# Patient Record
Sex: MALE | Race: WHITE | NOT HISPANIC OR LATINO | Employment: FULL TIME | URBAN - METROPOLITAN AREA
[De-identification: names, ages, dates, MRNs, and addresses within clinical notes are randomized per-mention and may not be internally consistent; named-entity substitution may affect disease eponyms.]

---

## 2018-12-10 ENCOUNTER — OFFICE VISIT (OUTPATIENT)
Dept: MEDICAL GROUP | Facility: PHYSICIAN GROUP | Age: 48
End: 2018-12-10
Payer: COMMERCIAL

## 2018-12-10 VITALS
BODY MASS INDEX: 25.2 KG/M2 | DIASTOLIC BLOOD PRESSURE: 118 MMHG | OXYGEN SATURATION: 96 % | SYSTOLIC BLOOD PRESSURE: 138 MMHG | TEMPERATURE: 98.6 F | WEIGHT: 176 LBS | HEIGHT: 70 IN | HEART RATE: 81 BPM

## 2018-12-10 DIAGNOSIS — M54.9 BACK PAIN, UNSPECIFIED BACK LOCATION, UNSPECIFIED BACK PAIN LATERALITY, UNSPECIFIED CHRONICITY: ICD-10-CM

## 2018-12-10 DIAGNOSIS — I10 ESSENTIAL HYPERTENSION: ICD-10-CM

## 2018-12-10 DIAGNOSIS — Z83.1 FAMILY HISTORY OF HEPATITIS B: ICD-10-CM

## 2018-12-10 PROCEDURE — 99214 OFFICE O/P EST MOD 30 MIN: CPT | Performed by: NURSE PRACTITIONER

## 2018-12-10 RX ORDER — GABAPENTIN 300 MG/1
300 CAPSULE ORAL DAILY
COMMUNITY
Start: 2018-12-04 | End: 2019-01-16

## 2018-12-10 RX ORDER — LISINOPRIL 20 MG/1
20 TABLET ORAL DAILY
Qty: 90 TAB | Refills: 0 | Status: SHIPPED | OUTPATIENT
Start: 2018-12-10 | End: 2019-01-16 | Stop reason: SINTOL

## 2018-12-10 ASSESSMENT — PATIENT HEALTH QUESTIONNAIRE - PHQ9: CLINICAL INTERPRETATION OF PHQ2 SCORE: 0

## 2018-12-10 NOTE — ASSESSMENT & PLAN NOTE
This is a chronic problem for the patient that is uncontrolled.  The patient's current blood pressure is 138/118 with a heart rate of 81.  The patient was prescribed lisinopril 10 mg to be taken 1 time per day in December 2016.  Patient reports that he had taken this medication for 3 months and was supposed to follow-up with his PCP, but he did not follow-up and did not have refills, so he has been not taking lisinopril since the beginning of January 2017.  The patient reports that whenever he has his blood pressure checked his diastolic is always around 120.  The patient does not check his blood pressure on his own, but states he will buy a home blood pressure monitor cuff.  He was with some family who checked his blood pressure and he states that his systolic will be 170-220 and diastolic usually around 120. The patient denies chest pain, shortness of breath, headaches, dizziness, blurry vision, or dyspnea on exertion.

## 2018-12-10 NOTE — ASSESSMENT & PLAN NOTE
This is a chronic problem for the patient that is managed by pain specialty, [facility] Val[facility]UNC[facility].  The patient follows up 1 time per month for this issue.

## 2018-12-10 NOTE — PROGRESS NOTES
CC:   Chief Complaint   Patient presents with   • Hypertension   • Orders Needed     labs for hep B        HISTORY OF THE PRESENT ILLNESS: Patient is a 48 y.o. male. This pleasant patient is here today to discuss high blood pressure and requests labs to check for hepatitis B.    Health Maintenance: Updated, patient refuses flu shot.    Back pain  This is a chronic problem for the patient that is managed by pain specialty, [facility] DUNC[facility].  The patient follows up 1 time per month for this issue.    Essential hypertension  This is a chronic problem for the patient that is uncontrolled.  The patient's current blood pressure is 138/118 with a heart rate of 81.  The patient was prescribed lisinopril 10 mg to be taken 1 time per day in December 2016.  Patient reports that he had taken this medication for 3 months and was supposed to follow-up with his PCP, but he did not follow-up and did not have refills, so he has been not taking lisinopril since the beginning of January 2017.  The patient reports that whenever he has his blood pressure checked his diastolic is always around 120.  The patient does not check his blood pressure on his own, but states he will buy a home blood pressure monitor cuff.  He was with some family who checked his blood pressure and he states that his systolic will be 170-220 and diastolic usually around 120. The patient denies chest pain, shortness of breath, headaches, dizziness, blurry vision, or dyspnea on exertion.    Family history of hepatitis B  This is a new problem for the patient.  The patient's brother recently was tested positive for hepatitis B, and he recently found out that his mom is hepatitis B carrier.  It was recommended that all of his siblings be tested for hepatitis, patient is requesting lab work.    Allergies: Patient has no known allergies.    Current Outpatient Prescriptions Ordered in [facility]   Medication Sig Dispense Refill   •  lisinopril (PRINIVIL) 20 MG Tab Take 1 Tab by mouth every day. 90 Tab 0   • tizanidine (ZANAFLEX) 4 MG TABS Take 4 mg by mouth every 6 hours as needed.     • hydrocodone/apap  (NORCO)  MG TABS Take 1-2 Tabs by mouth every 8 hours as needed for Mild Pain. 60 Each 1   • gabapentin (NEURONTIN) 300 MG Cap Take 300 mg by mouth every day.     • sildenafil citrate (VIAGRA) 25 MG Tab Take 1 Tab by mouth as needed for Erectile Dysfunction. 10 Tab 3   • rizatriptan (MAXALT) 10 MG tablet Take 10 mg by mouth Once PRN for Migraine.       No current [facility]-ordered facility-administered medications on file.        Past Medical History:   Diagnosis Date   • Headache(784.0)    • Hypertension    • Neck pain        Past Surgical History:   Procedure Laterality Date   • VA DSTR NROLYTC AGNT PARVERTEB FCT SNGL CRVCL/THORA Right 10/21/2016    Procedure: NEURO DEST FACET C/T W/IG SNGL;  Surgeon: Kelton Gould D.O.;  [facility];  Service: Pain Management   • VA DSTR NROLYTC AGNT PARVERTEB FCT ADDL CRVCL/THORA  10/21/2016    Procedure: NEURO DEST FACET C/T W/IG ADDL;  Surgeon: Kelton Gould D.O.;  [facility];  Service: Pain Management   • VA DSTR NROLYTC AGNT PARVERTEB FCT SNGL CRVCL/THORA Left 7/8/2016    Procedure: NEURO DEST FACET C/T W/IG SNGL - C5-7;  Surgeon: Kelton Gould D.O.;  [facility];  Service: Pain Management   • VA DSTR NROLYTC AGNT PARVERTEB FCT ADDL CRVCL/THORA Left 7/8/2016    Procedure: NEURO DEST FACET C/T W/IG ADDL;  Surgeon: Kelton Gould D.O.;  [facility];  Service: Pain Management   • VA DSTR NROLYTC AGNT PARVERTEB FCT SNGL CRVCL/THORA Right 2/12/2016    Procedure: NEURO DEST FACET C/T W/IG SNGL - C5-7;  Surgeon: Kelton Gould D.O.;  [facility];  Service: Pain Management   • VA DSTR NROLYTC AGNT MARCOSERTEB FCT ADDL CRVCL/THORA  2/12/2016    Procedure: NEURO  DEST FACET C/T W/IG ADDL;  Surgeon: Kelton Gould D.O.;  Location: [facility];  Service: Pain Management   • PB INJ CERV/THORAC,W/WO CNTRST Right 11/20/2015    Procedure: INJ EPI NON NEUROLYTIC C/T - C7-T1 W/SPRING TIP CATHETER TO RIGHT C5-6;  Surgeon: Kelton Gould D.O.;  Location: [facility];  Service: Pain Management   • PB FLUORSCOPIC GUIDANCE SPINAL INJECTION  11/20/2015    Procedure: FLUOROGUIDE FOR SPINAL INJ;  Surgeon: Kelton Gould D.O.;  Location: [facility];  Service: Pain Management   • NEURO DEST FACET C/T W/IG SNGL Right 5/8/2015    Procedure: RIGHT C4-7 FACET JOINT NERVE ABLATIONFLUORO, PRONE, RF PROTOCOL;  Surgeon: Kelton Gould D.O.;  Location: [facility];  Service: Pain Management   • NEURO DEST FACET C/T W/IG ADDL  5/8/2015    Procedure: NEURO DEST FACET C/T W/IG ADDL;  Surgeon: Kelton Gould D.O.;  Location: [facility];  Service: Pain Management   • NEURO DEST FACET C/T W/IG ADDL  5/8/2015    Procedure: NEURO DEST FACET C/T W/IG ADDL;  Surgeon: Kelton Gould D.O.;  Location: [facility];  Service: Pain Management   • NEURO DEST FACET C/T W/IG SNGL  2/20/2015    Performed by Kelton Gould D.O. at [facility]   • NEURO DEST FACET C/T W/IG ADDL  2/20/2015    Performed by Kelton Gould D.O. at [facility]   • NEURO DEST FACET C/T W/IG ADDL  2/20/2015    Performed by Kelton Gould D.O. at [facility]   • NEURO DEST FACET L/S W/IG SNGL  3/21/2014    Performed by Kelton Gould D.O. at [facility]   • NEURO DEST FACET C/T W/IG ADDL  3/21/2014    Performed by Kelton Gould D.O. at [facility]   • NEURO DEST FACET C/T W/IG ADDL  3/21/2014    Performed by Kelton Gould D.O. at [facility]   • NEURO DEST FACET C/T W/IG ADDL  3/21/2014    Performed by Kelton Gould D.O. at  [facility]   • INJ,EPIDURAL,CERV/THOR SINGLE  9/13/2013    Performed by Destini Franklin D.O. at [facility]   • INJ,EPIDURAL,CERV/THOR SINGLE  9/13/2013    Performed by Destini Franklin D.O. at [facility]   • INJ,EPIDURAL,CERV/THOR SINGLE  9/13/2013    Performed by Destini Franklin D.O. at [facility]   • NEURO DEST FACET C/T W/IG SNGL  5/3/2013    Performed by Destini Franklin D.O. at [facility]   • NEURO DEST FACET C/T W/IG ADDL  5/3/2013    Performed by Destini Franklin D.O. at [facility]   • NEURO DEST FACET C/T W/IG SNGL  1/11/2013    Performed by Destini Franklin D.O. at [facility]   • NEURO DEST FACET C/T W/IG SNGL  6/1/2012    Performed by DESTINI FRANKLIN at [facility]   • PB INJECT NERV BLCK,GREAT OCCIPTL  5/11/2012    Performed by DESTINI FRANKLIN at [facility]   • [place] DEST,PARAVERTEBRAL,C/T,SINGLE  9/23/2011    Performed by DESTINI FRANKLIN at [facility]   • [place] DEST,PARAVERTEBRAL,C/T,ADDL  9/23/2011    Performed by DESTINI FRANKLIN at [facility]   • [place] DEST,PARAVERTEBRAL,C/T,ADDL  9/23/2011    Performed by DESTINI FRANKLIN at [facility]   • [place] DEST,PARAVERTEBRAL,C/T,ADDL  9/23/2011    Performed by DESTINI FRANKLIN at [facility]   • [place] DEST,PARAVERTEBRAL,C/T,ADDL  8/5/2011    Performed by DESTINI FRANKLIN at [facility]   • [place] DEST,PARAVERTEBRAL,C/T,SINGLE  8/5/2011    Performed by DESTINI FRANKLIN at [facility]   • PB INJ DX/THER AGNT PARAVERT FACET JOINT, CE*  7/15/2011    Performed by DESTINI FRANKLIN at [facility]   • PB INJ DX/THER AGNT PARAVERT FACET JOINT, CE*  7/1/2011    Performed by DESTINI FRANKLIN at [facility]   • LAMINOTOMY  2/18/2010    C4-C5 fusion   • ELBOW ORIF  year 2009    bilateral elbow surgery   • OTHER  ORTHOPEDIC SURGERY      bilat knee surgeries    • OTHER ORTHOPEDIC SURGERY      bilat elbow surgeries for tennis elbow   • OTHER ORTHOPEDIC SURGERY      cervical fusions       Social History   Substance Use Topics   • Smoking status: Never Smoker   • Smokeless tobacco: Never Used   • Alcohol use No       Social History     Social History Narrative   • No narrative on file       Family History   Problem Relation Age of Onset   • Other Mother         hep b carrier   • Hypertension Father    • No Known Problems Sister    • No Known Problems Brother    • No Known Problems Maternal Grandmother    • No Known Problems Maternal Grandfather    • No Known Problems Paternal Grandmother    • No Known Problems Paternal Grandfather    • No Known Problems Sister    • No Known Problems Brother    • No Known Problems Brother    • No Known Problems Brother    • No Known Problems Brother    • Other Brother         Hep B +       ROS:   Constitutional:  Negative for fever, chills, unexpected weight change, night sweats, body aches, sleep issues,  and fatigue/generalized weakness.   HEENT:  Negative for headaches, vision changes, hearing changes, ear pain, tinnitus, ear discharge, rhinorrhea, sinus congestion, sneezing, sore throat, and neck pain.    Respiratory:  Negative for cough, shortness of breath, sputum production, hemoptysis, chest congestion, dyspnea, wheezing, and crackles.    Cardiovascular:  Negative for chest pain, palpitations, DE LUNA, paroxsymal nocturnal dyspnea, orthopnea, and bilateral lower extremity edema.   Gastrointestinal:  Negative for heartburn, nausea, vomiting, abdominal pain, hematochezia, melena, diarrhea, constipation, and greasy/foul-smelling stools.   Genitourinary:  Negative for dysuria, nocturia, polyuria, hematuria, pyuria, urinary urgency, urinary frequency, and urinary incontinence.   Musculoskeletal: Positive for neck, back pain.    Skin:  Negative for rash, sores, lumps, itching, cyanotic skin color  "change.   Neurological:  Negative for dizziness, tingling, tremors, focal sensory deficit, focal weakness and headaches.   Endo/Heme/Allergies:  Does not bruise/bleed easily. Denies cold/heat intolerance.   Psychiatric/Behavioral: Negative for depression, suicidal/homicidal ideation and memory loss.        Exam: Blood pressure 138/118, pulse 81, temperature 37 °C (98.6 °F), temperature source Temporal, height 1.778 m (5' 10\"), weight 79.8 kg (176 lb), SpO2 96 %. Body mass index is 25.25 kg/m².    General:  Normal appearing. No distress.  HEENT:  Normocephalic. Eyes conjunctiva clear lids without ptosis, pupils equal and reactive to light accommodation, ears normal shape and contour.   Pulmonary:  Clear to ausculation.  Normal effort. No rales, ronchi, or wheezing.  Cardiovascular:  Regular rate and rhythm without murmur. Carotid and radial pulses are intact and equal bilaterally.  Neurologic:  Grossly nonfocal.  Skin:  Warm and dry.  No obvious lesions.  Musculoskeletal:  Normal gait. No extremity cyanosis, clubbing, or edema.  Psych:  Normal mood and affect. Alert and oriented x3. Judgment and insight is normal.    Assessment/Plan  1. Essential hypertension  This is a chronic problem for the patient that is uncontrolled.  The patient reports that his blood pressure will range from 170-220/120.  2 years ago he was prescribed lisinopril 10 mg to be taken 1 time per day, but patient states that he only took this medication for 3 months and ran out of refills and never followed up with PCP.  Plan to start lisinopril 20 mg tab to be taken 1 time per day.  The patient's last lab work was completed in 2010, plan to check a CMP, lipid profile, microalbumin creatinine ratio urine prior to follow-up in 1 month.  - COMP METABOLIC PANEL; Future  - Lipid Profile; Future  - MICROALBUMIN CREAT RATIO URINE; Future  - lisinopril (PRINIVIL) 20 MG Tab; Take 1 Tab by mouth every day.  Dispense: 90 Tab; Refill: 0    2. Family history " of hepatitis B  This is a new problem for the patient.  The patient recently found out that his brother was hepatitis B positive and his mother was hepatitis B carrier.  He is requesting a hepatitis panel, order provided.  - HEPATITIS PANEL ACUTE(4 COMPONENTS); Future    3. Back pain, unspecified back location, unspecified back pain laterality, unspecified chronicity  This is a chronic problem for the patient that is managed by [facility].    Educated in proper administration of medication(s) ordered today including safety, possible SE, risks, benefits, rationale and alternatives to therapy.   Supportive care, differential diagnoses, and indications for immediate follow-up discussed with patient.    Pathogenesis of diagnosis discussed including typical length and natural progression.    Instructed to return to clinic or nearest emergency department for any change in condition, further concerns, or worsening of symptoms.  Patient states understanding of the plan of care and discharge instructions.    Return in about 1 month (around 1/10/2019) for Hypertension, Follow up Labs, Establish.    Please note that this dictation was created using voice recognition software. I have made every reasonable attempt to correct obvious errors, but I expect that there are errors of grammar and possibly content that I did not discover before finalizing the note.

## 2018-12-10 NOTE — ASSESSMENT & PLAN NOTE
This is a new problem for the patient.  The patient's brother recently was tested positive for hepatitis B, and he recently found out that his mom is hepatitis B carrier.  It was recommended that all of his siblings be tested for hepatitis, patient is requesting lab work.

## 2019-01-16 ENCOUNTER — OFFICE VISIT (OUTPATIENT)
Dept: MEDICAL GROUP | Facility: PHYSICIAN GROUP | Age: 49
End: 2019-01-16
Payer: COMMERCIAL

## 2019-01-16 ENCOUNTER — HOSPITAL ENCOUNTER (OUTPATIENT)
Dept: LAB | Facility: MEDICAL CENTER | Age: 49
End: 2019-01-16
Attending: NURSE PRACTITIONER
Payer: COMMERCIAL

## 2019-01-16 VITALS
HEART RATE: 71 BPM | DIASTOLIC BLOOD PRESSURE: 98 MMHG | HEIGHT: 70 IN | SYSTOLIC BLOOD PRESSURE: 132 MMHG | OXYGEN SATURATION: 97 % | BODY MASS INDEX: 24.91 KG/M2 | TEMPERATURE: 98.2 F | WEIGHT: 174 LBS

## 2019-01-16 DIAGNOSIS — Z83.1 FAMILY HISTORY OF HEPATITIS B: ICD-10-CM

## 2019-01-16 DIAGNOSIS — I10 ESSENTIAL HYPERTENSION: ICD-10-CM

## 2019-01-16 LAB
ALBUMIN SERPL BCP-MCNC: 4.4 G/DL (ref 3.2–4.9)
ALBUMIN/GLOB SERPL: 1.4 G/DL
ALP SERPL-CCNC: 47 U/L (ref 30–99)
ALT SERPL-CCNC: 26 U/L (ref 2–50)
ANION GAP SERPL CALC-SCNC: 11 MMOL/L (ref 0–11.9)
AST SERPL-CCNC: 19 U/L (ref 12–45)
BILIRUB SERPL-MCNC: 0.7 MG/DL (ref 0.1–1.5)
BUN SERPL-MCNC: 17 MG/DL (ref 8–22)
CALCIUM SERPL-MCNC: 9.4 MG/DL (ref 8.5–10.5)
CHLORIDE SERPL-SCNC: 105 MMOL/L (ref 96–112)
CHOLEST SERPL-MCNC: 214 MG/DL (ref 100–199)
CO2 SERPL-SCNC: 23 MMOL/L (ref 20–33)
CREAT SERPL-MCNC: 0.91 MG/DL (ref 0.5–1.4)
CREAT UR-MCNC: 118.9 MG/DL
FASTING STATUS PATIENT QL REPORTED: NORMAL
GLOBULIN SER CALC-MCNC: 3.2 G/DL (ref 1.9–3.5)
GLUCOSE SERPL-MCNC: 98 MG/DL (ref 65–99)
HAV IGM SERPL QL IA: NEGATIVE
HBV CORE IGM SER QL: NEGATIVE
HBV SURFACE AG SER QL: NEGATIVE
HCV AB SER QL: NEGATIVE
HDLC SERPL-MCNC: 54 MG/DL
LDLC SERPL CALC-MCNC: 121 MG/DL
MICROALBUMIN UR-MCNC: <0.7 MG/DL
MICROALBUMIN/CREAT UR: NORMAL MG/G (ref 0–30)
POTASSIUM SERPL-SCNC: 4.3 MMOL/L (ref 3.6–5.5)
PROT SERPL-MCNC: 7.6 G/DL (ref 6–8.2)
SODIUM SERPL-SCNC: 139 MMOL/L (ref 135–145)
TRIGL SERPL-MCNC: 193 MG/DL (ref 0–149)

## 2019-01-16 PROCEDURE — 82043 UR ALBUMIN QUANTITATIVE: CPT

## 2019-01-16 PROCEDURE — 82570 ASSAY OF URINE CREATININE: CPT

## 2019-01-16 PROCEDURE — 36415 COLL VENOUS BLD VENIPUNCTURE: CPT

## 2019-01-16 PROCEDURE — 80061 LIPID PANEL: CPT

## 2019-01-16 PROCEDURE — 99213 OFFICE O/P EST LOW 20 MIN: CPT | Performed by: NURSE PRACTITIONER

## 2019-01-16 PROCEDURE — 80053 COMPREHEN METABOLIC PANEL: CPT

## 2019-01-16 PROCEDURE — 80074 ACUTE HEPATITIS PANEL: CPT

## 2019-01-16 RX ORDER — EPINEPHRINE 0.3 MG/.3ML
0.3 INJECTION SUBCUTANEOUS PRN
COMMUNITY
Start: 2019-01-06

## 2019-01-16 RX ORDER — AMLODIPINE BESYLATE 5 MG/1
5 TABLET ORAL DAILY
Qty: 30 TAB | Refills: 0 | Status: SHIPPED | OUTPATIENT
Start: 2019-01-16 | End: 2019-02-20 | Stop reason: SDUPTHER

## 2019-01-16 ASSESSMENT — PATIENT HEALTH QUESTIONNAIRE - PHQ9: CLINICAL INTERPRETATION OF PHQ2 SCORE: 0

## 2019-01-16 NOTE — PROGRESS NOTES
CC:   Chief Complaint   Patient presents with   • Hypertension     follow up        HISTORY OF THE PRESENT ILLNESS: Patient is a 48 y.o. male. This pleasant patient is here today follow-up on hypertension.    Health Maintenance: Completed, patient refuses flu vaccine.    Essential hypertension  This is a chronic problem for the patient that remains uncontrolled.  The patient's blood pressure on arrival today is 128/106 with a heart rate of 71, compared to 12/10/2018 138/118 heart rate 81.  Recheck at the end of the appointment: 132/98 with heart rate of 71.  One month ago the patient was started on lisinopril 20 mg/day.  Approximately 10 days ago the patient was having a feeling that his uvula and throat were swelling and he was having difficulty breathing so he went to the [facility] emergency room.  There he was given steroids and a prescription for an EpiPen as needed.  The patient has continued taking the lisinopril since this episode.  The patient does not check his blood pressure at home.  Our goal blood pressure for this patient is <140/90. The patient denies chest pain, shortness of breath, headaches, dizziness, blurry vision, or dyspnea on exertion.    Allergies: Lisinopril    Current Outpatient Prescriptions Ordered in [facility]   Medication Sig Dispense Refill   • amLODIPine (NORVASC) 5 MG Tab Take 1 Tab by mouth every day. 30 Tab 0   • rizatriptan (MAXALT) 10 MG tablet Take 10 mg by mouth Once PRN for Migraine.     • tizanidine (ZANAFLEX) 4 MG TABS Take 4 mg by mouth every 6 hours as needed.     • hydrocodone/apap  (NORCO)  MG TABS Take 1-2 Tabs by mouth every 8 hours as needed for Mild Pain. 60 Each 1   • EPINEPHrine (EPIPEN) 0.3 MG/0.3ML Solution Auto-injector solution for injection 0.3 mg by Intramuscular route as needed.     • sildenafil citrate (VIAGRA) 25 MG Tab Take 1 Tab by mouth as needed for Erectile Dysfunction. 10 Tab 3     No current Epic-ordered  facility-administered medications on file.        Past Medical History:   Diagnosis Date   • Headache(784.0)    • Hypertension    • Neck pain        Past Surgical History:   Procedure Laterality Date   • [place] DSTR NROLYTC AGNT PARVERTEB FCT SNGL CRVCL/THORA Right 10/21/2016    Procedure: NEURO DEST FACET C/T W/IG SNGL;  Surgeon: Kelton Gould D.O.;  Location[place] [facility];  Service: Pain Management   • KY DSTR NROLYTC AGNT PARVERTEB FCT ADDL CRVCL/THORA  10/21/2016    Procedure: NEURO DEST FACET C/T W/IG ADDL;  Surgeon: Kelton Gould D.O.;  Location[place] [facility] [facility];  Service: Pain Management   • KY DSTR NROLYTC AGNT PARVERTEB FCT SNGL CRVCL/THORA Left 7/8/2016    Procedure: NEURO DEST FACET C/T W/IG SNGL - C5-7;  Surgeon: Kelton Gould D.O.;  Location[place] [facility] [facility];  Service: Pain Management   • KY DSTR NROLYTC AGNT PARVERTEB FCT ADDL CRVCL/THORA Left 7/8/2016    Procedure: NEURO DEST FACET C/T W/IG ADDL;  Surgeon: Kelton Gould D.O.;  Location[place] [facility] [facility];  Service: Pain Management   • [place] DSTR NROLYTC AGNT PARVERTEB FCT SNGL CRVCL/THORA Right 2/12/2016    Procedure: NEURO DEST FACET C/T W/IG SNGL - C5-7;  Surgeon: Kelton Gould D.O.;  Location: [facility] [place] [facility];  Service: Pain Management   • [place] DSTR NROLYTC AGNT PARVERTEB FCT ADDL CRVCL/THORA  2/12/2016    Procedure: NEURO DEST FACET C/T W/IG ADDL;  Surgeon: Kelton Gould D.O.;  Location: [facility] [place] [facility];  Service: Pain Management   • PB INJ CERV/THORAC,W/WO CNTRST Right 11/20/2015    Procedure: INJ EPI NON NEUROLYTIC C/T - C7-T1 W/SPRING TIP CATHETER TO RIGHT C5-6;  Surgeon: Kelton Gould D.O.;  Location: [facility];  Service: Pain Management   • PB FLUORSCOPIC GUIDANCE SPINAL INJECTION  11/20/2015    Procedure: FLUOROGUIDE FOR SPINAL INJ;  Surgeon: Kelton Gould D.O.;  Location: SUR[facility]ERY [facility];  Service: Pain Management   • NEURO  DEST FACET C/T W/IG SNGL Right 5/8/2015    Procedure: RIGHT C4-7 FACET JOINT NERVE ABLATIONFLUORO, PRONE, RF PROTOCOL;  Surgeon: Kelton Gould D.O.;  Location: [facility];  Service: Pain Management   • NEURO DEST FACET C/T W/IG ADDL  5/8/2015    Procedure: NEURO DEST FACET C/T W/IG ADDL;  Surgeon: Kelton Gould D.O.;  Location: [facility];  Service: Pain Management   • NEURO DEST FACET C/T W/IG ADDL  5/8/2015    Procedure: NEURO DEST FACET C/T W/IG ADDL;  Surgeon: Kelton Gould D.O.;  Location: [facility];  Service: Pain Management   • NEURO DEST FACET C/T W/IG SNGL  2/20/2015    Performed by Kelton Gould D.O. at [facility]   • NEURO DEST FACET C/T W/IG ADDL  2/20/2015    Performed by Kelton Gould D.O. at [facility]   • NEURO DEST FACET C/T W/IG ADDL  2/20/2015    Performed by Kelton Gould D.O. at [facility]   • NEURO DEST FACET L/S W/IG SNGL  3/21/2014    Performed by Kelton Gould D.O. at [facility]   • NEURO DEST FACET C/T W/IG ADDL  3/21/2014    Performed by Kelton Gould D.O. at [facility]   • NEURO DEST FACET C/T W/IG ADDL  3/21/2014    Performed by Kelton Gould D.O. at [facility]   • NEURO DEST FACET C/T W/IG ADDL  3/21/2014    Performed by Kelton Gould D.O. at [facility]   • INJ,EPIDURAL,CERV/THOR SINGLE  9/13/2013    Performed by Kelton Gould D.O. at [facility]   • INJ,EPIDURAL,CERV/THOR SINGLE  9/13/2013    Performed by Kelton Gould D.O. at [facility]   • INJ,EPIDURAL,CERV/THOR SINGLE  9/13/2013    Performed by Kelton Gould D.O. at [facility]   • NEURO DEST FACET C/T W/IG SNGL  5/3/2013    Performed by Kelton Gould D.O. at [facility]   • NEURO DEST FACET C/T W/IG ADDL  5/3/2013    Performed by Kelton Gould D.O. at  [facility]   • NEURO DEST FACET C/T W/IG SNGL  1/11/2013    Performed by Destini Franklin D.O. at [facility]   • NEURO DEST FACET C/T W/IG SNGL  6/1/2012    Performed by DESTINI FRANKLIN at [facility]   • PB INJECT NERV BLCK,GREAT OCCIPTL  5/11/2012    Performed by DESTINI FRANKLIN at [facility]   • AK DEST,PARAVERTEBRAL,C/T,SINGLE  9/23/2011    Performed by DESTINI FRANKLIN at [facility]   • AK DEST,PARAVERTEBRAL,C/T,ADDL  9/23/2011    Performed by DESTINI FRANKLIN at [facility]   • AK DEST,PARAVERTEBRAL,C/T,ADDL  9/23/2011    Performed by DESTINI RFANKLIN at [facility]   • AK DEST,PARAVERTEBRAL,C/T,ADDL  9/23/2011    Performed by DESTINI FRANKLIN at [facility]   • AK DEST,PARAVERTEBRAL,C/T,ADDL  8/5/2011    Performed by DESTINI FRANKLIN at [facility]   • AK DEST,PARAVERTEBRAL,C/T,SINGLE  8/5/2011    Performed by DESTINI FRANKLIN at [facility]   • PB INJ DX/THER AGNT PARAVERT FACET JOINT, CE*  7/15/2011    Performed by DESTINI FRANKLIN at [facility]   • PB INJ DX/THER AGNT PARAVERT FACET JOINT, CE*  7/1/2011    Performed by DESTINI FRANKLIN at [facility]   • LAMINOTOMY  2/18/2010    C4-C5 fusion   • ELBOW ORIF  year 2009    bilateral elbow surgery   • OTHER ORTHOPEDIC SURGERY      bilat knee surgeries    • OTHER ORTHOPEDIC SURGERY      bilat elbow surgeries for tennis elbow   • OTHER ORTHOPEDIC SURGERY      cervical fusions       Social History   Substance Use Topics   • Smoking status: Never Smoker   • Smokeless tobacco: Never Used   • Alcohol use No       Social History     Social History Narrative   • No narrative on file       Family History   Problem Relation Age of Onset   • Other Mother         hep b carrier   • Hypertension Father    • No Known Problems Sister    • No Known Problems Brother    • No Known  "Problems Maternal Grandmother    • No Known Problems Maternal Grandfather    • No Known Problems Paternal Grandmother    • No Known Problems Paternal Grandfather    • No Known Problems Sister    • No Known Problems Brother    • No Known Problems Brother    • No Known Problems Brother    • No Known Problems Brother    • Other Brother         Hep B +     ROS:   Constitutional:  Negative for fever, chills, unexpected weight change, night sweats, body aches, sleep issues,  and fatigue/generalized weakness.   HEENT: Positive for throat swelling.  Negative for headaches, vision changes, hearing changes, ear pain, tinnitus, ear discharge, rhinorrhea, sinus congestion, sneezing, sore throat, and neck pain.    Respiratory:  Negative for cough, shortness of breath, sputum production, hemoptysis, chest congestion, dyspnea, wheezing, and crackles.    Cardiovascular:  Negative for chest pain, palpitations, DE LUNA, paroxsymal nocturnal dyspnea, orthopnea, and bilateral lower extremity edema.   Gastrointestinal:  Negative for heartburn, nausea, vomiting, abdominal pain, hematochezia, melena, diarrhea, constipation, and greasy/foul-smelling stools.   Genitourinary:  Negative for dysuria, nocturia, polyuria, hematuria, pyuria, urinary urgency, urinary frequency, and urinary incontinence.   Musculoskeletal:  Negative for myalgias, back pain, and joint pain.   Skin:  Negative for rash, sores, lumps, itching, cyanotic skin color change.   Neurological:  Negative for dizziness, tingling, tremors, focal sensory deficit, focal weakness and headaches.   Endo/Heme/Allergies:  Does not bruise/bleed easily. Denies cold/heat intolerance.   Psychiatric/Behavioral: Negative for depression, suicidal/homicidal ideation and memory loss.        Exam: Blood pressure 132/98, pulse 71, temperature 36.8 °C (98.2 °F), temperature source Temporal, height 1.778 m (5' 10\"), weight 78.9 kg (174 lb), SpO2 97 %. Body mass index is 24.97 kg/m².    General:  Normal " appearing. No distress.  HEENT:  Normocephalic. Eyes conjunctiva clear lids without ptosis, pupils equal and reactive to light accommodation, ears normal shape and contour, oropharynx is without erythema, edema or exudates.   Neck:  Supple without JVD or bruit.  Pulmonary:  Clear to ausculation.  Normal effort. No rales, ronchi, or wheezing.  Cardiovascular:  Regular rate and rhythm without murmur. Carotid and radial pulses are intact and equal bilaterally.  Abdomen:  Soft, nontender, nondistended. Normal bowel sounds. Liver and spleen are not palpable.  Neurologic:  Grossly nonfocal.  Lymph:  No cervical, supraclavicular or axillary lymph nodes are palpable.  Skin:  Warm and dry.  No obvious lesions.  Musculoskeletal:  Normal gait. No extremity cyanosis, clubbing, or edema.  Psych:  Normal mood and affect. Alert and oriented x3. Judgment and insight is normal.    Assessment/Plan  1. Essential hypertension  amLODIPine (NORVASC) 5 MG Tab  The patient will stop taking lisinopril, this medication has been added to his list of allergies.  Patient will start amlodipine 5 mg/day and follow-up in 1 month to review blood pressure, patient advised to notify PCP if any side effects from amlodipine occur.  The patient was prescribed an EpiPen when he was at the ER, advised patient to keep EpiPen with him just in case.  Previously ordered lab work was completed this morning, patient requests that we review it at the follow-up appointment and that he does not need a call with the results.     Educated in proper administration of medication(s) ordered today including safety, possible SE, risks, benefits, rationale and alternatives to therapy.   Supportive care, differential diagnoses, and indications for immediate follow-up discussed with patient.    Pathogenesis of diagnosis discussed including typical length and natural progression.    Instructed to return to clinic or nearest emergency department for any change in condition,  further concerns, or worsening of symptoms.  Patient states understanding of the plan of care and discharge instructions.    Consent for records release signed to order medical records from [facility] for most recent emergency room visit.    Return in about 1 month (around 2/16/2019) for Hypertension, Follow up Labs.    Please note that this dictation was created using voice recognition software. I have made every reasonable attempt to correct obvious errors, but I expect that there are errors of grammar and possibly content that I did not discover before finalizing the note.

## 2019-01-16 NOTE — LETTER
[facility]  DORINA Jones  [address]  [place] [address]  Fax: [phone]   Authorization for Release/Disclosure of   Protected Health Information   Name: MARY SHARP : 1970 SSN: [id number]   Address: [address] [place] [address] [place] [address] Phone:    [phone] (home)    I authorize the entity listed below to release/disclose the PHI below to:   [facility]/DORINA Jones and DORINA Jones   Provider or Entity Name:  [facility]   Address   City, [facility]   Phone:      Fax:     Reason for request: continuity of care   Information to be released:    [  ] LAST COLONOSCOPY,  including any PATH REPORT and follow-up  [  ] LAST FIT/COLOGUARD RESULT [  ] LAST DEXA  [  ] LAST MAMMOGRAM  [  ] LAST PAP  [  ] LAST LABS [  ] RETINA EXAM REPORT  [  ] IMMUNIZATION RECORDS  [X] Release all info      [  ] Check here and initial the line next to each item to release ALL health information INCLUDING  _____ Care and treatment for drug and / or alcohol abuse  _____ HIV testing, infection status, or AIDS  _____ Genetic Testing    DATES OF SERVICE OR TIME PERIOD TO BE DISCLOSED: _____________  I understand and acknowledge that:  * This Authorization may be revoked at any time by you in writing, except if your health information has already been used or disclosed.  * Your health information that will be used or disclosed as a result of you signing this authorization could be re-disclosed by the recipient. If this occurs, your re-disclosed health information may no longer be protected by State or Federal laws.  * You may refuse to sign this Authorization. Your refusal will not affect your ability to obtain treatment.  * This Authorization becomes effective upon signing and will  on (date) __________.      If no date is indicated, this Authorization will  one (1) year from the signature date.    Name: Mary Sharp    Signature:   Date:      1/16/2019       PLEASE FAX REQUESTED RECORDS BACK TO: [phone]

## 2019-01-16 NOTE — ASSESSMENT & PLAN NOTE
This is a chronic problem for the patient that remains uncontrolled.  The patient's blood pressure on arrival today is 128/106 with a heart rate of 71, compared to 12/10/2018 138/118 heart rate 81.  Recheck at the end of the appointment: 132/98 with heart rate of 71.  One month ago the patient was started on lisinopril 20 mg/day.  Approximately 10 days ago the patient was having a feeling that his uvula and throat were swelling and he was having difficulty breathing so he went to the [facility] emergency room.  There he was given steroids and a prescription for an EpiPen as needed.  The patient has continued taking the lisinopril since this episode.  The patient does not check his blood pressure at home.  Our goal blood pressure for this patient is <140/90. The patient denies chest pain, shortness of breath, headaches, dizziness, blurry vision, or dyspnea on exertion.

## 2019-01-17 PROBLEM — E78.2 MIXED HYPERLIPIDEMIA: Status: ACTIVE | Noted: 2019-01-17

## 2019-02-20 ENCOUNTER — OFFICE VISIT (OUTPATIENT)
Dept: MEDICAL GROUP | Facility: PHYSICIAN GROUP | Age: 49
End: 2019-02-20
Payer: COMMERCIAL

## 2019-02-20 VITALS
HEART RATE: 79 BPM | OXYGEN SATURATION: 98 % | TEMPERATURE: 98.2 F | HEIGHT: 70 IN | DIASTOLIC BLOOD PRESSURE: 74 MMHG | WEIGHT: 175 LBS | SYSTOLIC BLOOD PRESSURE: 128 MMHG | BODY MASS INDEX: 25.05 KG/M2

## 2019-02-20 DIAGNOSIS — I10 ESSENTIAL HYPERTENSION: Chronic | ICD-10-CM

## 2019-02-20 PROCEDURE — 99213 OFFICE O/P EST LOW 20 MIN: CPT | Performed by: NURSE PRACTITIONER

## 2019-02-20 RX ORDER — AMLODIPINE BESYLATE 5 MG/1
5 TABLET ORAL DAILY
Qty: 90 TAB | Refills: 3 | Status: SHIPPED | OUTPATIENT
Start: 2019-02-20 | End: 2020-03-10

## 2019-02-20 NOTE — ASSESSMENT & PLAN NOTE
"This is a chronic problem for the patient that is now stable.  The patient's blood pressure is 128/74 with heart rate of 79.  One month ago the patient was started on amlodipine 5 mg/day, patient denies any side effects from this medication.  The patient is not checking his blood pressure on his own, but states that he \"feels better\".  Patient states that he \"does not think his blood pressure has been this good in at least 7 years\".  Our blood pressure goal for this patient is <140/90. The patient denies chest pain, shortness of breath, headaches, dizziness, blurry vision, or dyspnea on exertion.  "

## 2019-02-20 NOTE — PROGRESS NOTES
"CC:   Chief Complaint   Patient presents with   • Hypertension   • Results     labs       HISTORY OF THE PRESENT ILLNESS: Patient is a 48 y.o. male. This pleasant patient is here today to follow-up on hypertension and review labs.    Health Maintenance: Completed    Essential hypertension  This is a chronic problem for the patient that is now stable.  The patient's blood pressure is 128/74 with heart rate of 79.  One month ago the patient was started on amlodipine 5 mg/day, patient denies any side effects from this medication.  The patient is not checking his blood pressure on his own, but states that he \"feels better\".  Patient states that he \"does not think his blood pressure has been this good in at least 7 years\".  Our blood pressure goal for this patient is <140/90. The patient denies chest pain, shortness of breath, headaches, dizziness, blurry vision, or dyspnea on exertion.    Allergies: Lisinopril    Current Outpatient Prescriptions Ordered in [facility]   Medication Sig Dispense Refill   • amLODIPine (NORVASC) 5 MG Tab Take 1 Tab by mouth every day. 90 Tab 3   • rizatriptan (MAXALT) 10 MG tablet Take 10 mg by mouth Once PRN for Migraine.     • tizanidine (ZANAFLEX) 4 MG TABS Take 4 mg by mouth every 6 hours as needed.     • hydrocodone/apap  (NORCO)  MG TABS Take 1-2 Tabs by mouth every 8 hours as needed for Mild Pain. 60 Each 1   • EPINEPHrine (EPIPEN) 0.3 MG/0.3ML Solution Auto-injector solution for injection 0.3 mg by Intramuscular route as needed.     • sildenafil citrate (VIAGRA) 25 MG Tab Take 1 Tab by mouth as needed for Erectile Dysfunction. 10 Tab 3     No current Epic-ordered facility-administered medications on file.        Past Medical History:   Diagnosis Date   • Headache(784.0)    • Hypertension    • Neck pain        Past Surgical History:   Procedure Laterality Date   • MS DSTR NROLYTC AGNT PARVERTEB FCT SNGL CRVCL/THORA Right 10/21/2016    Procedure: NEURO DEST FACET C/T W/IG SNGL;  " Surgeon: Kelton Gould D.O.;  Location: SURGERY [facility] ORS;  Service: Pain Management   • W[place] DSTR NROLYTC AGNT PARVERTEB FCT ADDL CRVCL/THORA  10/21/2016    Procedure: NEURO DEST FACET C/T W/IG ADDL;  Surgeon: Kelton Gould D.O.;  Location: [facility] ORS;  Service: Pain Management   • W[place] DSTR NROLYTC AGNT PARVERTEB FCT SNGL CRVCL/THORA Left 7/8/2016    Procedure: NEURO DEST FACET C/T W/IG SNGL - C5-7;  Surgeon: Kelton Gould D.O.;  Location: SURGERY [facility] ORS;  Service: Pain Management   • W[place] DSTR NROLYTC AGNT PARVERTEB FCT ADDL CRVCL/THORA Left 7/8/2016    Procedure: NEURO DEST FACET C/T W/IG ADDL;  Surgeon: Kelton Gould D.O.;  Location: [facility];  Service: Pain Management   • W[place] DSTR NROLYTC AGNT PARVERTEB FCT SNGL CRVCL/THORA Right 2/12/2016    Procedure: NEURO DEST FACET C/T W/IG SNGL - C5-7;  Surgeon: Kelton Gould D.O.;  Location: [facility];  Service: Pain Management   • W[place] DSTR NROLYTC AGNT PARVERTEB FCT ADDL CRVCL/THORA  2/12/2016    Procedure: NEURO DEST FACET C/T W/IG ADDL;  Surgeon: Kelton Gould D.O.;  Location: [facility] ORS;  Service: Pain Management   • PB INJ CERV/THORAC,W/WO CNTRST Right 11/20/2015    Procedure: INJ EPI NON NEUROLYTIC C/T - C7-T1 W/SPRING TIP CATHETER TO RIGHT C5-6;  Surgeon: Kelton Gould D.O.;  Location: SURGERY [facility] ORS;  Service: Pain Management   • PB FLUORSCOPIC GUIDANCE SPINAL INJECTION  11/20/2015    Procedure: FLUOROGUIDE FOR SPINAL INJ;  Surgeon: Kelton Gould D.O.;  Location: SURGERY [facility] ORS;  Service: Pain Management   • NEURO DEST FACET C/T W/IG SNGL Right 5/8/2015    Procedure: RIGHT C4-7 FACET JOINT NERVE ABLATIONFLUORO, PRONE, RF PROTOCOL;  Surgeon: Kelton Gould D.O.;  Location: SUR[facility]ERY [facility];  Service: Pain Management   • NEURO DEST FACET C/T W/IG ADDL  5/8/2015    Procedure: NEURO DEST FACET C/T W/IG ADDL;  Surgeon:  Destini Franklin D.O.;  Location: [facility];  Service: Pain Management   • NEURO DEST FACET C/T W/IG ADDL  5/8/2015    Procedure: NEURO DEST FACET C/T W/IG ADDL;  Surgeon: Destini Franklin D.O.;  Location: [facility];  Service: Pain Management   • NEURO DEST FACET C/T W/IG SNGL  2/20/2015    Performed by Destini Franklin D.O. at [facility]   • NEURO DEST FACET C/T W/IG ADDL  2/20/2015    Performed by Destini Franklin D.O. at [facility]   • NEURO DEST FACET C/T W/IG ADDL  2/20/2015    Performed by Destini Franklin D.O. at [facility] ORS   • NEURO DEST FACET L/S W/IG SNGL  3/21/2014    Performed by Destini Franklin D.O. at [facility] ORS   • NEURO DEST FACET C/T W/IG ADDL  3/21/2014    Performed by Destini Franklin D.O. at [facility]   • NEURO DEST FACET C/T W/IG ADDL  3/21/2014    Performed by Destini Franklin D.O. at [facility]   • NEURO DEST FACET C/T W/IG ADDL  3/21/2014    Performed by Destini Franklin D.O. at [facility]   • INJ,EPIDURAL,CERV/THOR SINGLE  9/13/2013    Performed by Destini Franklin D.O. at [facility]   • INJ,EPIDURAL,CERV/THOR SINGLE  9/13/2013    Performed by Destini Franklin D.O. at [facility]   • INJ,EPIDURAL,CERV/THOR SINGLE  9/13/2013    Performed by Destini Franklin D.O. at [facility]   • NEURO DEST FACET C/T W/IG SNGL  5/3/2013    Performed by Destini Franklin D.O. at [facility]   • NEURO DEST FACET C/T W/IG ADDL  5/3/2013    Performed by Destini Franklin D.O. at [facility]   • NEURO DEST FACET C/T W/IG SNGL  1/11/2013    Performed by Destini Franklin D.O. at [facility]   • NEURO DEST FACET C/T W/IG SNGL  6/1/2012    Performed by DESTINI FRANKLIN at [facility]   • PB INJECT NERV BLCK,GREAT OCCIPTL  5/11/2012    Performed by  DESTINI FRANKLIN at [facility]   • NJ DEST,PARAVERTEBRAL,C/T,SINGLE  9/23/2011    Performed by DESTINI FRANKLIN at [facility]   • NJ DEST,PARAVERTEBRAL,C/T,ADDL  9/23/2011    Performed by DESTINI FRANKLIN at [facility]   • NJ DEST,PARAVERTEBRAL,C/T,ADDL  9/23/2011    Performed by DESTINI FRANKLIN at [facility]   • NJ DEST,PARAVERTEBRAL,C/T,ADDL  9/23/2011    Performed by DESTINI FRANKLIN at [facility]   • NJ DEST,PARAVERTEBRAL,C/T,ADDL  8/5/2011    Performed by DESTINI FRANKLIN at [facility]   • NJ DEST,PARAVERTEBRAL,C/T,SINGLE  8/5/2011    Performed by DESTINI FRANKLIN at [facility]   • PB INJ DX/THER AGNT PARAVERT FACET JOINT, CE*  7/15/2011    Performed by DESTINI FRANKLIN at [facility]   • PB INJ DX/THER AGNT PARAVERT FACET JOINT, CE*  7/1/2011    Performed by DESTINI FRANKLIN at [facility]   • LAMINOTOMY  2/18/2010    C4-C5 fusion   • ELBOW ORIF  year 2009    bilateral elbow surgery   • OTHER ORTHOPEDIC SURGERY      bilat knee surgeries    • OTHER ORTHOPEDIC SURGERY      bilat elbow surgeries for tennis elbow   • OTHER ORTHOPEDIC SURGERY      cervical fusions       Social History   Substance Use Topics   • Smoking status: Never Smoker   • Smokeless tobacco: Never Used   • Alcohol use No       Social History     Social History Narrative   • No narrative on file       Family History   Problem Relation Age of Onset   • Other Mother         hep b carrier   • Hypertension Father    • No Known Problems Sister    • No Known Problems Brother    • No Known Problems Maternal Grandmother    • No Known Problems Maternal Grandfather    • No Known Problems Paternal Grandmother    • No Known Problems Paternal Grandfather    • No Known Problems Sister    • No Known Problems Brother    • No Known Problems Brother    • No Known Problems Brother    • No Known Problems Brother   "  • Other Brother         Hep B +       ROS:   Constitutional:  Negative for fever, chills, unexpected weight change, night sweats, body aches, sleep issues,  and fatigue/generalized weakness.   Cardiovascular:  Negative for chest pain, palpitations, DE LUNA, paroxsymal nocturnal dyspnea, orthopnea, and bilateral lower extremity edema.   Skin:  Negative for rash, sores, lumps, itching, cyanotic skin color change.   Neurological:  Negative for dizziness, tingling, tremors, focal sensory deficit, focal weakness and headaches.   Psychiatric/Behavioral: Negative for depression, suicidal/homicidal ideation and memory loss.        Exam: Blood pressure 128/74, pulse 79, temperature 36.8 °C (98.2 °F), temperature source Temporal, height 1.778 m (5' 10\"), weight 79.4 kg (175 lb), SpO2 98 %. Body mass index is 25.11 kg/m².    General:  Normal appearing. No distress.  Neck:  Supple without JVD or bruit.   Pulmonary:  Clear to ausculation.  Normal effort. No rales, ronchi, or wheezing.  Cardiovascular:  Regular rate and rhythm without murmur. Carotid and radial pulses are intact and equal bilaterally.  Neurologic:  Grossly nonfocal.  Skin:  Warm and dry.  No obvious lesions.  Musculoskeletal:  Normal gait. No extremity cyanosis, clubbing, or edema.  Psych: Normal mood and affect. Alert and oriented x3. Judgment and insight is normal.    Labs: CMP, lipid profile, microalbumin creatinine ratio urine, hepatitis testing results from 1/16/2019 reviewed with patient, plan to recheck labs in 6-12 months.    Assessment/Plan:  1. Essential hypertension  amLODIPine (NORVASC) 5 MG Tab     Educated in proper administration of medication(s) ordered today including safety, possible SE, risks, benefits, rationale and alternatives to therapy.   Supportive care, differential diagnoses, and indications for immediate follow-up discussed with patient.    Pathogenesis of diagnosis discussed including typical length and natural progression. "    Instructed to return to clinic or nearest emergency department for any change in condition, further concerns, or worsening of symptoms.  Patient states understanding of the plan of care and discharge instructions.    Return in about 1 year (around 2/20/2020) for Preventative Annual.    Please note that this dictation was created using voice recognition software. I have made every reasonable attempt to correct obvious errors, but I expect that there are errors of grammar and possibly content that I did not discover before finalizing the note.

## 2020-03-09 DIAGNOSIS — I10 ESSENTIAL HYPERTENSION: Chronic | ICD-10-CM

## 2020-03-10 RX ORDER — AMLODIPINE BESYLATE 5 MG/1
TABLET ORAL
Qty: 90 TAB | Refills: 0 | Status: SHIPPED | OUTPATIENT
Start: 2020-03-10 | End: 2020-03-16 | Stop reason: SDUPTHER

## 2020-03-10 NOTE — TELEPHONE ENCOUNTER
Received request via: Pharmacy    Was the patient seen in the last year in this department? Yes 2/20/19    Does the patient have an active prescription (recently filled or refills available) for medication(s) requested? No

## 2020-03-10 NOTE — TELEPHONE ENCOUNTER
Requested Prescriptions     Pending Prescriptions Disp Refills   • amLODIPine (NORVASC) 5 MG Tab [Pharmacy Med Name: AMLODIPINE 5 MG     TAB ASCE] 90 Tab 0     Sig: TAKE ONE TABLET BY MOUTH EVERY DAY       OSMAR Jones.

## 2020-03-16 ENCOUNTER — OFFICE VISIT (OUTPATIENT)
Dept: MEDICAL GROUP | Facility: PHYSICIAN GROUP | Age: 50
End: 2020-03-16
Payer: COMMERCIAL

## 2020-03-16 ENCOUNTER — HOSPITAL ENCOUNTER (OUTPATIENT)
Facility: MEDICAL CENTER | Age: 50
End: 2020-03-16
Attending: NURSE PRACTITIONER
Payer: COMMERCIAL

## 2020-03-16 VITALS
HEART RATE: 97 BPM | SYSTOLIC BLOOD PRESSURE: 132 MMHG | HEIGHT: 69 IN | TEMPERATURE: 98.5 F | WEIGHT: 182 LBS | BODY MASS INDEX: 26.96 KG/M2 | RESPIRATION RATE: 12 BRPM | OXYGEN SATURATION: 98 % | DIASTOLIC BLOOD PRESSURE: 94 MMHG

## 2020-03-16 DIAGNOSIS — E78.2 MIXED HYPERLIPIDEMIA: ICD-10-CM

## 2020-03-16 DIAGNOSIS — N30.01 ACUTE CYSTITIS WITH HEMATURIA: ICD-10-CM

## 2020-03-16 DIAGNOSIS — I10 ESSENTIAL HYPERTENSION: ICD-10-CM

## 2020-03-16 DIAGNOSIS — R10.13 EPIGASTRIC PAIN: ICD-10-CM

## 2020-03-16 PROBLEM — R30.0 DYSURIA: Status: ACTIVE | Noted: 2020-03-16

## 2020-03-16 LAB
APPEARANCE UR: NORMAL
BILIRUB UR STRIP-MCNC: NEGATIVE MG/DL
COLOR UR AUTO: NORMAL
GLUCOSE UR STRIP.AUTO-MCNC: NEGATIVE MG/DL
KETONES UR STRIP.AUTO-MCNC: NEGATIVE MG/DL
LEUKOCYTE ESTERASE UR QL STRIP.AUTO: NORMAL
NITRITE UR QL STRIP.AUTO: POSITIVE
PH UR STRIP.AUTO: 6 [PH] (ref 5–8)
PROT UR QL STRIP: NORMAL MG/DL
RBC UR QL AUTO: NORMAL
SP GR UR STRIP.AUTO: 1.02
UROBILINOGEN UR STRIP-MCNC: 0.2 MG/DL

## 2020-03-16 PROCEDURE — 87086 URINE CULTURE/COLONY COUNT: CPT

## 2020-03-16 PROCEDURE — 87077 CULTURE AEROBIC IDENTIFY: CPT

## 2020-03-16 PROCEDURE — 81002 URINALYSIS NONAUTO W/O SCOPE: CPT | Performed by: NURSE PRACTITIONER

## 2020-03-16 PROCEDURE — 99214 OFFICE O/P EST MOD 30 MIN: CPT | Performed by: NURSE PRACTITIONER

## 2020-03-16 PROCEDURE — 87186 SC STD MICRODIL/AGAR DIL: CPT

## 2020-03-16 RX ORDER — AMLODIPINE BESYLATE 10 MG/1
10 TABLET ORAL
Qty: 90 TAB | Refills: 1 | Status: SHIPPED | OUTPATIENT
Start: 2020-03-16 | End: 2020-04-29 | Stop reason: SDUPTHER

## 2020-03-16 RX ORDER — SULFAMETHOXAZOLE AND TRIMETHOPRIM 800; 160 MG/1; MG/1
1 TABLET ORAL 2 TIMES DAILY
Qty: 28 TAB | Refills: 0 | Status: SHIPPED | OUTPATIENT
Start: 2020-03-16 | End: 2020-03-30

## 2020-03-16 RX ORDER — OMEPRAZOLE 20 MG/1
20 CAPSULE, DELAYED RELEASE ORAL DAILY
Qty: 90 CAP | Refills: 1 | Status: SHIPPED | OUTPATIENT
Start: 2020-03-16 | End: 2020-11-23

## 2020-03-16 ASSESSMENT — PATIENT HEALTH QUESTIONNAIRE - PHQ9: CLINICAL INTERPRETATION OF PHQ2 SCORE: 0

## 2020-03-16 NOTE — ASSESSMENT & PLAN NOTE
"This is a new problem to the examiner.  Chronic, ongoing.  Not taking any cholesterol lowering medications.  10-year cardiac risk ASCVD score: 3.54%  Reports diet is \" okay\".   He is not following a low-cholesterol diet.  He is not exercising regularly.  He is not taking ASA daily.  He denies dizziness, claudication, or chest pain.  Due for updated lab work.   6/23/2010 10:04 1/16/2019 06:44   Cholesterol,Tot 202 (H) 214 (H)   Triglycerides 80 193 (H)   HDL 41 54    121 (H)     "

## 2020-03-16 NOTE — PROGRESS NOTES
"CC:   Chief Complaint   Patient presents with   • Dysuria     Painful urination since Thursday.   • Hypertension     Rx refill     HISTORY OF THE PRESENT ILLNESS: Patient is a 49 y.o. male. This pleasant patient is here today to discuss dysuria and request hypertension medication refill.    Health Maintenance: Completed    Essential hypertension  Chronic, ongoing.    Currently prescribed amlodipine 5 mg/day.  States he has been without the medication for approximately 2 weeks.  Reports diet is \" okay\".   He is not following a low-salt diet.  He is not exercising regularly.  He is not taking baby aspirin daily.   He is not monitoring BP at home.   Reports: Blood pressures are elevated at other doctors appointments and was elevated in the ER in December 2019  Denies symptoms low BP: light-headed, tunnel-vision, unusual fatigue, dizziness.  Denies symptoms high BP: pounding headache, visual changes, palpitations, flushed face.   Denies chest pain, shortness of breath, dyspnea on exertion.   Denies medicine side effects: unusual fatigue, slow heartbeat, foot/leg swelling, cough.  Vitals 12/10/2018 1/16/2019 2/20/2019 3/16/2020   SYSTOLIC 138 132 128 132   DIASTOLIC 118 98 74 94   PULSE 81 71 79 97       Mixed hyperlipidemia  This is a new problem to the examiner.  Chronic, ongoing.  Not taking any cholesterol lowering medications.  10-year cardiac risk ASCVD score: 3.54%  Reports diet is \" okay\".   He is not following a low-cholesterol diet.  He is not exercising regularly.  He is not taking ASA daily.  He denies dizziness, claudication, or chest pain.  Due for updated lab work.   6/23/2010 10:04 1/16/2019 06:44   Cholesterol,Tot 202 (H) 214 (H)   Triglycerides 80 193 (H)   HDL 41 54    121 (H)       Dysuria  This is a new problem to the examiner.  Patient reports that on Wednesday of last week he noticed that his urine had a bad smell to it and it would burn when he would void.  He also felt that he had to " "frequently go to the bathroom without much urine produced.  States that he looked it up online and thought it may be a urinary tract infection.  He has since increased his water and cranberry juice intake which is managing the symptoms.  He is requesting to have his urine tested.    Epigastric pain  New problem to examiner.  Patient reports that he has had \"stomach issues\" for more than 20 years.  States that he has had EGDs in the past when living in a different state, does not recall the results of the EGDs as they were more than 20 years ago.  The patient was seen in [facility] emergency room in December 2019 for abdominal pain.  States he believes he was told he might have an ulcer.  He is not taking any medication for this issue.  Denies heartburn, nausea, vomiting, difficulty swallowing, nighttime cough, constipation, diarrhea, bloody stools.  Reports a constant epigastric area pain.  Is interested in starting a PPI and possibly discussing a referral to GI if PPI is not effective.    Allergies: Lisinopril    Current Outpatient Medications Ordered in Epic   Medication Sig Dispense Refill   • sulfamethoxazole-trimethoprim (BACTRIM DS) 800-160 MG tablet Take 1 Tab by mouth 2 times a day for 14 days. 28 Tab 0   • amLODIPine (NORVASC) 10 MG Tab Take 1 Tab by mouth every day. 90 Tab 1   • omeprazole (PRILOSEC) 20 MG delayed-release capsule Take 1 Cap by mouth every day. 90 Cap 1   • EPINEPHrine (EPIPEN) 0.3 MG/0.3ML Solution Auto-injector solution for injection 0.3 mg by Intramuscular route as needed.     • rizatriptan (MAXALT) 10 MG tablet Take 10 mg by mouth Once PRN for Migraine.     • hydrocodone/apap  (NORCO)  MG TABS Take 1-2 Tabs by mouth every 8 hours as needed for Mild Pain. 60 Each 1     No current [facility]-ordered facility-administered medications on file.      Past Medical History:   Diagnosis Date   • Headache(784.0)    • Hypertension    • Neck pain      Past Surgical " History:   Procedure Laterality Date   • MS DSTR NROLYTC AGNT PARVERTEB FCT SNGL CRVCL/THORA Right 10/21/2016    Procedure: NEURO DEST FACET C/T W/IG SNGL;  Surgeon: Kelton Gould D.O.;  Location: SURGERY SURGICAL [facility] ORS;  Service: Pain Management   • MS DSTR NROLYTC AGNT PARVERTEB FCT ADDL CRVCL/THORA  10/21/2016    Procedure: NEURO DEST FACET C/T W/IG ADDL;  Surgeon: Kelton Gould D.O.;  Location: SURGERY SURGICAL [facility] ORS;  Service: Pain Management   • MS DSTR NROLYTC AGNT PARVERTEB FCT SNGL CRVCL/THORA Left 7/8/2016    Procedure: NEURO DEST FACET C/T W/IG SNGL - C5-7;  Surgeon: Kelton Gould D.O.;  Location: SURGERY [facility] ORS;  Service: Pain Management   • MS DSTR NROLYTC AGNT PARVERTEB FCT ADDL CRVCL/THORA Left 7/8/2016    Procedure: NEURO DEST FACET C/T W/IG ADDL;  Surgeon: Kelton Gould D.O.;  Location: SURGERY [facility] ORS;  Service: Pain Management   • MS DSTR NROLYTC AGNT PARVERTEB FCT SNGL CRVCL/THORA Right 2/12/2016    Procedure: NEURO DEST FACET C/T W/IG SNGL - C5-7;  Surgeon: Kelton Gould D.O.;  Location: SURGERY SURGICAL [facility] ORS;  Service: Pain Management   • MS DSTR NROLYTC AGNT PARVERTEB FCT ADDL CRVCL/THORA  2/12/2016    Procedure: NEURO DEST FACET C/T W/IG ADDL;  Surgeon: Kelton Gould D.O.;  Location: SURGERY SURGICAL [facility] ORS;  Service: Pain Management   • PB INJ CERV/THORAC,W/WO CNTRST Right 11/20/2015    Procedure: INJ EPI NON NEUROLYTIC C/T - C7-T1 W/SPRING TIP CATHETER TO RIGHT C5-6;  Surgeon: Kelton Gould D.O.;  Location: SURGERY SURGICAL [facility] ORS;  Service: Pain Management   • PB FLUORSCOPIC GUIDANCE SPINAL INJECTION  11/20/2015    Procedure: FLUOROGUIDE FOR SPINAL INJ;  Surgeon: Kelton Gould D.O.;  Location: [facility];  Service: Pain Management   • NEURO DEST FACET C/T W/IG SNGL Right 5/8/2015    Procedure: RIGHT C4-7 FACET JOINT NERVE ABLATIONFLUORO, PRONE, RF PROTOCOL;  Surgeon: Kelton Gould D.O.;   Location: [facility] ORS;  Service: Pain Management   • NEURO DEST FACET C/T W/IG ADDL  5/8/2015    Procedure: NEURO DEST FACET C/T W/IG ADDL;  Surgeon: Kelton Gould D.O.;  Location: [facility] ORS;  Service: Pain Management   • NEURO DEST FACET C/T W/IG ADDL  5/8/2015    Procedure: NEURO DEST FACET C/T W/IG ADDL;  Surgeon: Kelton Gould D.O.;  Location: [facility];  Service: Pain Management   • NEURO DEST FACET C/T W/IG SNGL  2/20/2015    Performed by Kelton Gould D.O. at [facility] ORS   • NEURO DEST FACET C/T W/IG ADDL  2/20/2015    Performed by Kelton Gould D.O. at [facility]   • NEURO DEST FACET C/T W/IG ADDL  2/20/2015    Performed by Kelton Gould D.O. at [facility] ORS   • NEURO DEST FACET L/S W/IG SNGL  3/21/2014    Performed by Kelton Gould D.O. at [facility] ORS   • NEURO DEST FACET C/T W/IG ADDL  3/21/2014    Performed by Kelton Gould D.O. at [facility] ORS   • NEURO DEST FACET C/T W/IG ADDL  3/21/2014    Performed by Kelton Gould D.O. at [facility] ORS   • NEURO DEST FACET C/T W/IG ADDL  3/21/2014    Performed by Kelton Gould D.O. at [facility] ORS   • INJ,EPIDURAL,CERV/THOR SINGLE  9/13/2013    Performed by Kelton Gould D.O. at [facility] ORS   • INJ,EPIDURAL,CERV/THOR SINGLE  9/13/2013    Performed by Kelton Gould D.O. at [facility] ORS   • INJ,EPIDURAL,CERV/THOR SINGLE  9/13/2013    Performed by Kelton Gould D.O. at [facility] ORS   • NEURO DEST FACET C/T W/IG SNGL  5/3/2013    Performed by Kelton Gould D.O. at [facility] ORS   • NEURO DEST FACET C/T W/IG ADDL  5/3/2013    Performed by Kelton Gould D.O. at [facility]   • NEURO DEST FACET C/T W/IG SNGL  1/11/2013    Performed by Kelton Gould D.O. at [facility]   • NEURO DEST  FACET C/T W/IG SNGL  6/1/2012    Performed by DESTINI FRANKLIN at [facility] ORS   • ID INJ(S) NERVE BLOCK GREAT OCCIPTL  5/11/2012    Performed by DESTINI FRANKLIN at [facility] ORS   • ID DEST,PARAVERTEBRAL,C/T,SINGLE  9/23/2011    Performed by DESTINI FRANKLIN at [facility]   • ID DEST,PARAVERTEBRAL,C/T,ADDL  9/23/2011    Performed by DESTINI FRANKLIN at [facility]   • ID DEST,PARAVERTEBRAL,C/T,ADDL  9/23/2011    Performed by DESTINI FRANKLIN at [facility]   • ID DEST,PARAVERTEBRAL,C/T,ADDL  9/23/2011    Performed by DESTINI FRANKLIN at [facility]S   • ID DEST,PARAVERTEBRAL,C/T,ADDL  8/5/2011    Performed by DESTINI FRANKLIN at [facility]   • ID DEST,PARAVERTEBRAL,C/T,SINGLE  8/5/2011    Performed by DESTINI FRANKLIN at [facility]   • PB INJ DX/THER AGNT PARAVERT FACET JOINT, CE*  7/15/2011    Performed by DESTINI FRANKLIN at [facility] ORS   • PB INJ DX/THER AGNT PARAVERT FACET JOINT, CE*  7/1/2011    Performed by DESTINI FRANKLIN at [facility] ORS   • LAMINOTOMY  2/18/2010    C4-C5 fusion   • ELBOW ORIF  year 2009    bilateral elbow surgery   • OTHER ORTHOPEDIC SURGERY      bilat knee surgeries    • OTHER ORTHOPEDIC SURGERY      bilat elbow surgeries for tennis elbow   • OTHER ORTHOPEDIC SURGERY      cervical fusions     Social History     Tobacco Use   • Smoking status: Never Smoker   • Smokeless tobacco: Never Used   Substance Use Topics   • Alcohol use: No   • Drug use: No     Social History     Social History Narrative   • Not on file     Family History   Problem Relation Age of Onset   • Other Mother         hep b carrier   • Hypertension Father    • No Known Problems Sister    • No Known Problems Brother    • No Known Problems Maternal Grandmother    • No Known Problems Maternal Grandfather    • No Known Problems Paternal Grandmother    • No Known  "Problems Paternal Grandfather    • No Known Problems Sister    • No Known Problems Brother    • No Known Problems Brother    • No Known Problems Brother    • No Known Problems Brother    • Other Brother         Hep B +     ROS:   Constitutional: No Fevers, Chills  Eyes: No eye pain  ENT: No sore throat  Resp: No Shortness of breath  CV: No Chest pain  GI: + Constant epigastric area pain.  No Nausea/Vomiting  : + Dysuria, urine odor, urine frequency  MSK: No weakness  Skin: No rashes  Neuro: No Headaches  Psych: No Suicidal ideations    All remaining systems reviewed and found to be negative, except as stated above.      Exam: /94 (BP Location: Left arm, Patient Position: Sitting, BP Cuff Size: Adult)   Pulse 97   Temp 36.9 °C (98.5 °F) (Temporal)   Resp 12   Ht 1.753 m (5' 9\")   Wt 82.6 kg (182 lb)   SpO2 98%  Body mass index is 26.88 kg/m².    General: Well nourished, well developed male in NAD, awake and conversant.  Eyes: Normal conjunctiva, anicteric.  Round symmetrical pupils.  ENT: Hearing grossly intact.  No nasal discharge.  Neck: Neck is supple.  No masses or thyromegaly.  CV: No lower extremity edema.  Respiratory: Respirations are nonlabored.  No wheezing.  Abdomen: Non-Distended.  Skin: Warm.  No rashes or ulcers.  MSK: Normal ambulation.  No clubbing or cyanosis.  Neuro: Sensation and CN II-XII grossly normal.  Psych: Alert and oriented.  Cooperative, appropriate mood and affect, normal judgment.    Assessment/Plan:  1. Acute cystitis with hematuria  New problem for patient that began last Wednesday.  POCT urinalysis positive, urine to be sent for culture.  We will start Bactrim DS 1 tab twice daily for 14 days.  Will notify patient of results of culture if medication needs to be changed.  - POCT Urinalysis  - sulfamethoxazole-trimethoprim (BACTRIM DS) 800-160 MG tablet; Take 1 Tab by mouth 2 times a day for 14 days.  Dispense: 28 Tab; Refill: 0  - URINE CULTURE(NEW); Future    2. " Epigastric pain  Chronic, ongoing.  Patient would like to try omeprazole 20 mg 1 time per day.  If symptoms do not resolve, he will be interested in a referral to GI for further work-up.  Patient states that he has had EGDs in the past, but this was more than 20 years ago.  Patient will be following up in 1 week to review lab work, we will discuss this problem and medication again.  - omeprazole (PRILOSEC) 20 MG delayed-release capsule; Take 1 Cap by mouth every day.  Dispense: 90 Cap; Refill: 1    3. Essential hypertension  Chronic, ongoing.  Encourage patient to check his blood pressure at home and keep a log.  Patient wants to increase amlodipine from 5 mg/day to 10 mg/day, states he tolerates the medication well.  Had an allergic reaction to lisinopril.  Due for updated labs.  - CBC WITH DIFFERENTIAL; Future  - Comp Metabolic Panel; Future  - MICROALBUMIN CREAT RATIO URINE; Future  - TSH WITH REFLEX TO FT4; Future  - amLODIPine (NORVASC) 10 MG Tab; Take 1 Tab by mouth every day.  Dispense: 90 Tab; Refill: 1    4. Mixed hyperlipidemia  Chronic, ongoing.  Encouraged healthy diet, exercise, weight loss.  Due for updated labs.  - CBC WITH DIFFERENTIAL; Future  - Comp Metabolic Panel; Future  - Lipid Profile; Future    Educated in proper administration of medication(s) ordered today including safety, possible SE, risks, benefits, rationale and alternatives to therapy.   Supportive care, differential diagnoses, and indications for immediate follow-up discussed with patient.    Pathogenesis of diagnosis discussed including typical length and natural progression.    Instructed to return to clinic or nearest emergency department for any change in condition, further concerns, or worsening of symptoms.  Patient states understanding of the plan of care and discharge instructions.    Return in about 1 week (around 3/23/2020) for Follow up Labs, Follow up Medications.    Please note that this dictation was created using voice  recognition software. I have made every reasonable attempt to correct obvious errors, but I expect that there are errors of grammar and possibly content that I did not discover before finalizing the note.

## 2020-03-16 NOTE — ASSESSMENT & PLAN NOTE
"New problem to examiner.  Patient reports that he has had \"stomach issues\" for more than 20 years.  States that he has had EGDs in the past when living in a different state, does not recall the results of the EGDs as they were more than 20 years ago.  The patient was seen in [facility] emergency room in December 2019 for abdominal pain.  States he believes he was told he might have an ulcer.  He is not taking any medication for this issue.  Denies heartburn, nausea, vomiting, difficulty swallowing, nighttime cough, constipation, diarrhea, bloody stools.  Reports a constant epigastric area pain.  Is interested in starting a PPI and possibly discussing a referral to GI if PPI is not effective.  "

## 2020-03-16 NOTE — ASSESSMENT & PLAN NOTE
"Chronic, ongoing.    Currently prescribed amlodipine 5 mg/day.  States he has been without the medication for approximately 2 weeks.  Reports diet is \" okay\".   He is not following a low-salt diet.  He is not exercising regularly.  He is not taking baby aspirin daily.   He is not monitoring BP at home.   Reports: Blood pressures are elevated at other doctors appointments and was elevated in the ER in December 2019  Denies symptoms low BP: light-headed, tunnel-vision, unusual fatigue, dizziness.  Denies symptoms high BP: pounding headache, visual changes, palpitations, flushed face.   Denies chest pain, shortness of breath, dyspnea on exertion.   Denies medicine side effects: unusual fatigue, slow heartbeat, foot/leg swelling, cough.  Vitals 12/10/2018 1/16/2019 2/20/2019 3/16/2020   SYSTOLIC 138 132 128 132   DIASTOLIC 118 98 74 94   PULSE 81 71 79 97     "

## 2020-03-16 NOTE — ASSESSMENT & PLAN NOTE
This is a new problem to the examiner.  Patient reports that on Wednesday of last week he noticed that his urine had a bad smell to it and it would burn when he would void.  He also felt that he had to frequently go to the bathroom without much urine produced.  States that he looked it up online and thought it may be a urinary tract infection.  He has since increased his water and cranberry juice intake which is managing the symptoms.  He is requesting to have his urine tested.

## 2020-03-19 LAB
BACTERIA UR CULT: ABNORMAL
BACTERIA UR CULT: ABNORMAL
SIGNIFICANT IND 70042: ABNORMAL
SITE SITE: ABNORMAL
SOURCE SOURCE: ABNORMAL

## 2020-04-23 ENCOUNTER — OFFICE VISIT (OUTPATIENT)
Dept: URGENT CARE | Facility: PHYSICIAN GROUP | Age: 50
End: 2020-04-23
Payer: COMMERCIAL

## 2020-04-23 VITALS
RESPIRATION RATE: 18 BRPM | DIASTOLIC BLOOD PRESSURE: 88 MMHG | OXYGEN SATURATION: 98 % | WEIGHT: 179 LBS | HEIGHT: 69 IN | TEMPERATURE: 98.1 F | HEART RATE: 101 BPM | BODY MASS INDEX: 26.51 KG/M2 | SYSTOLIC BLOOD PRESSURE: 130 MMHG

## 2020-04-23 DIAGNOSIS — L02.414 ABSCESS OF LEFT ARM: ICD-10-CM

## 2020-04-23 PROCEDURE — 10061 I&D ABSCESS COMP/MULTIPLE: CPT | Performed by: INTERNAL MEDICINE

## 2020-04-23 ASSESSMENT — PAIN SCALES - GENERAL: PAINLEVEL: 8=MODERATE-SEVERE PAIN

## 2020-04-23 ASSESSMENT — ENCOUNTER SYMPTOMS
CHILLS: 0
FEVER: 0

## 2020-04-24 NOTE — PROGRESS NOTES
Subjective:     Miguel Navarro is a 49 y.o. male who presents for Cyst ((L) arm, pt states he was seen at [facility] and and was given anitbiotics)     Patient came with complaints of large abscess of his left upper arm and he said for more than a week is trying to squeeze pus out and it kept getting worse and then he went to the ER and on the [place] and they kept him overnight but no I&D was done and he was given IV Unasyn and p.o. antibiotics and is taking it.  The redness around around the abscess is slightly better but he still having a lot of pain    Patient denies any fever or chills or dizziness  HPI  Past Medical History:   Diagnosis Date   • Headache(784.0)    • Hypertension    • Neck pain      Past Surgical History:   Procedure Laterality Date   • [place] DSTR NROLYTC AGNT PARVERTEB FCT SNGL CRVCL/THORA Right 10/21/2016    Procedure: NEURO DEST FACET C/T W/IG SNGL;  Surgeon: Kelton Gould D.O.;  Location: SURGERY [facility];  Service: Pain Management   • [place] DSTR NROLYTC AGNT PARVERTEB FCT ADDL CRVCL/THORA  10/21/2016    Procedure: NEURO DEST FACET C/T W/IG ADDL;  Surgeon: Kelton Gould D.O.;  Location: [facility] ORS;  Service: Pain Management   • [place] DSTR NROLYTC AGNT PARVERTEB FCT SNGL CRVCL/THORA Left 7/8/2016    Procedure: NEURO DEST FACET C/T W/IG SNGL - C5-7;  Surgeon: Kelton Gould D.O.;  Location: SURGERY [facility];  Service: Pain Management   • [place] DSTR NROLYTC AGNT PARVERTEB FCT ADDL CRVCL/THORA Left 7/8/2016    Procedure: NEURO DEST FACET C/T W/IG ADDL;  Surgeon: Kelton Gould D.O.;  Location: SURGERY [facility];  Service: Pain Management   • NY DSTR NROLYTC AGNT PARVERTEB FCT SNGL CRVCL/THORA Right 2/12/2016    Procedure: NEURO DEST FACET C/T W/IG SNGL - C5-7;  Surgeon: Kelton Gould D.O.;  Location: [facility];  Service: Pain Management   • [place] DSTR NROLYTC AGNT KARMAEB FCT ADDL ZEYADL/STONE  2/12/2016    Procedure:  NEURO DEST FACET C/T W/IG ADDL;  Surgeon: Kelton Gould D.O.;  Location: [facility] ORS;  Service: Pain Management   • PB INJ CERV/THORAC,W/WO CNTRST Right 11/20/2015    Procedure: INJ EPI NON NEUROLYTIC C/T - C7-T1 W/SPRING TIP CATHETER TO RIGHT C5-6;  Surgeon: Kelton Gould D.O.;  Location: [facility] ORS;  Service: Pain Management   • PB FLUORSCOPIC GUIDANCE SPINAL INJECTION  11/20/2015    Procedure: FLUOROGUIDE FOR SPINAL INJ;  Surgeon: Kelton Gould D.O.;  Location: [facility] ORS;  Service: Pain Management   • NEURO DEST FACET C/T W/IG SNGL Right 5/8/2015    Procedure: RIGHT C4-7 FACET JOINT NERVE ABLATIONFLUORO, PRONE, RF PROTOCOL;  Surgeon: Kelton Gould D.O.;  Location: [facility] ORS;  Service: Pain Management   • NEURO DEST FACET C/T W/IG ADDL  5/8/2015    Procedure: NEURO DEST FACET C/T W/IG ADDL;  Surgeon: Kelton Gould D.O.;  Location: [facility] ORS;  Service: Pain Management   • NEURO DEST FACET C/T W/IG ADDL  5/8/2015    Procedure: NEURO DEST FACET C/T W/IG ADDL;  Surgeon: Kelton Gould D.O.;  Location: [facility] ORS;  Service: Pain Management   • NEURO DEST FACET C/T W/IG SNGL  2/20/2015    Performed by Kelton Gould D.O. at [facility] ORS   • NEURO DEST FACET C/T W/IG ADDL  2/20/2015    Performed by Kelton Gould D.O. at [facility] ORS   • NEURO DEST FACET C/T W/IG ADDL  2/20/2015    Performed by Kelton Gould D.O. at [facility] ORS   • NEURO DEST FACET L/S W/IG SNGL  3/21/2014    Performed by Kelton Gould D.O. at [facility] ORS   • NEURO DEST FACET C/T W/IG ADDL  3/21/2014    Performed by Kelton Gould D.O. at [facility] ORS   • NEURO DEST FACET C/T W/IG ADDL  3/21/2014    Performed by Kelton Gould D.O. at [facility]   • NEURO DEST FACET C/T W/IG ADDL  3/21/2014    Performed by Kelton Gould D.O.  at [facility] ORS   • INJ,EPIDURAL,CERV/THOR SINGLE  9/13/2013    Performed by Destini Franklin D.O. at [facility] ORS   • INJ,EPIDURAL,CERV/THOR SINGLE  9/13/2013    Performed by Destini Franklin D.O. at [facility] OR[facility]   • INJ,EPIDURAL,CERV/THOR SINGLE  9/13/2013    Performed by Destini Franklin D.O. at [facility] OR[facility]   • NEURO DEST FACET C/T W/IG SNGL  5/3/2013    Performed by Destini Franklin D.O. at [facility] OR[facility]   • NEURO DEST FACET C/T W/IG ADDL  5/3/2013    Performed by Destini Franklin D.O. at [facility] OR[facility]   • NEURO DEST FACET C/T W/IG SNGL  1/11/2013    Performed by Destini Franklin D.O. at [facility] OR[facility]   • NEURO DEST FACET C/T W/IG SNGL  6/1/2012    Performed by DESTINI FRANKLIN at [facility] ORS   • SD INJ(S) NERVE BLOCK GREAT OCCIPTL  5/11/2012    Performed by DESTINI FRANKLIN at [facility] OR[facility]   • SD DEST,PARAVERTEBRAL,C/T,SINGLE  9/23/2011    Performed by DESTINI FRANKLIN at [facility] ORS   • SD DEST,PARAVERTEBRAL,C/T,ADDL  9/23/2011    Performed by DESTINI FRANKLIN at [facility]   • SD DEST,PARAVERTEBRAL,C/T,ADDL  9/23/2011    Performed by DESTINI FRANKLIN at [facility]   • SD DEST,PARAVERTEBRAL,C/T,ADDL  9/23/2011    Performed by DESTINI FRANKLIN at [facility]   • SD DEST,PARAVERTEBRAL,C/T,ADDL  8/5/2011    Performed by DESTINI FRANKLIN at [facility]   • SD DEST,PARAVERTEBRAL,C/T,SINGLE  8/5/2011    Performed by DESTINI FRANKLIN at [facility]   • PB INJ DX/THER AGNT PARAVERT FACET JOINT, CE*  7/15/2011    Performed by DESTINI FRANKLIN at [facility]   • PB INJ DX/THER AGNT PARAVERT FACET JOINT, CE*  7/1/2011    Performed by DESTINI FRANKLIN at [facility]   • LAMINOTOMY  2/18/2010    C4-C5 fusion   • ELBOW ORIF  year 2009    bilateral elbow surgery   •  OTHER ORTHOPEDIC SURGERY      bilat knee surgeries    • OTHER ORTHOPEDIC SURGERY      bilat elbow surgeries for tennis elbow   • OTHER ORTHOPEDIC SURGERY      cervical fusions     Social History     Socioeconomic History   • Marital status:      Spouse name: Not on file   • Number of children: Not on file   • Years of education: Not on file   • Highest education level: Not on file   Occupational History   • Not on file   Social Needs   • Financial resource strain: Not on file   • Food insecurity     Worry: Not on file     Inability: Not on file   • Transportation needs     Medical: Not on file     Non-medical: Not on file   Tobacco Use   • Smoking status: Never Smoker   • Smokeless tobacco: Never Used   Substance and Sexual Activity   • Alcohol use: No   • Drug use: No   • Sexual activity: Yes     Partners: Female     Comment:  1996, 5 children 3 adopted   Lifestyle   • Physical activity     Days per week: Not on file     Minutes per session: Not on file   • Stress: Not on file   Relationships   • Social connections     Talks on phone: Not on file     Gets together: Not on file     Attends [employer] service: Not on file     Active member of club or organization: Not on file     Attends meetings of clubs or organizations: Not on file     Relationship status: Not on file   • Intimate partner violence     Fear of current or ex partner: Not on file     Emotionally abused: Not on file     Physically abused: Not on file     Forced sexual activity: Not on file   Other Topics Concern   • Not on file   Social History Narrative   • Not on file      Family History   Problem Relation Age of Onset   • Other Mother         hep b carrier   • Hypertension Father    • No Known Problems Sister    • No Known Problems Brother    • No Known Problems Maternal Grandmother    • No Known Problems Maternal Grandfather    • No Known Problems Paternal Grandmother    • No Known Problems Paternal Grandfather    • No Known Problems  "Sister    • No Known Problems Brother    • No Known Problems Brother    • No Known Problems Brother    • No Known Problems Brother    • Other Brother         Hep B +    Review of Systems   Constitutional: Negative for chills and fever.   Skin:        Large abscess on the left upper arm   All other systems reviewed and are negative.    Allergies   Allergen Reactions   • Lisinopril Swelling     Throat swelling      Objective:   /88 (BP Location: Left arm, Patient Position: Sitting, BP Cuff Size: Adult)   Pulse (!) 101   Temp 36.7 °C (98.1 °F) (Temporal)   Resp 18   Ht 1.753 m (5' 9\")   Wt 81.2 kg (179 lb)   SpO2 98%   BMI 26.43 kg/m²   Physical Exam  Vitals signs reviewed.   Constitutional:       General: He is not in acute distress.     Appearance: He is well-developed.   HENT:      Head: Normocephalic and atraumatic.   Eyes:      Conjunctiva/sclera: Conjunctivae normal.      Pupils: Pupils are equal, round, and reactive to light.   Skin:     General: Skin is warm and dry.      Comments: Large 15 x 7 cm abscess on the left arm, erythema, severe tenderness, fluctuant   Neurological:      Mental Status: He is alert and oriented to person, place, and time.   Psychiatric:         Thought Content: Thought content normal.           Assessment/Plan:   Assessment    1. Abscess of left arm    Procedure-2% lidocaine was used and 2 mL was injected was injected for local anesthesia after alcohol prep, using 11.0 incision was made and large amount of purulent drainage was obtained and patient did not want any culture at this time, and packing was done with plain gauze and loculations was broken up with blunt forceps.    Patient advised to follow-up in 3 days patient still has significant amount of still swelling present even after draining large amount of pus and he might need 1 more incision on the upper part of the swelling if it is not getting better.    Advised to continue the oral antibiotics  Differential " diagnosis, natural history, supportive care, and indications for immediate follow-up discussed.

## 2020-04-26 ENCOUNTER — OFFICE VISIT (OUTPATIENT)
Dept: URGENT CARE | Facility: PHYSICIAN GROUP | Age: 50
End: 2020-04-26
Payer: COMMERCIAL

## 2020-04-26 VITALS
HEIGHT: 69 IN | TEMPERATURE: 98.8 F | OXYGEN SATURATION: 95 % | HEART RATE: 90 BPM | WEIGHT: 179 LBS | RESPIRATION RATE: 18 BRPM | BODY MASS INDEX: 26.51 KG/M2 | SYSTOLIC BLOOD PRESSURE: 150 MMHG | DIASTOLIC BLOOD PRESSURE: 92 MMHG

## 2020-04-26 DIAGNOSIS — L02.414 ABSCESS OF LEFT ARM: ICD-10-CM

## 2020-04-26 PROCEDURE — 10061 I&D ABSCESS COMP/MULTIPLE: CPT | Performed by: PHYSICIAN ASSISTANT

## 2020-04-26 RX ORDER — KETOROLAC TROMETHAMINE 30 MG/ML
30 INJECTION, SOLUTION INTRAMUSCULAR; INTRAVENOUS ONCE
Status: COMPLETED | OUTPATIENT
Start: 2020-04-26 | End: 2020-04-26

## 2020-04-26 RX ADMIN — KETOROLAC TROMETHAMINE 30 MG: 30 INJECTION, SOLUTION INTRAMUSCULAR; INTRAVENOUS at 12:20

## 2020-04-26 ASSESSMENT — ENCOUNTER SYMPTOMS
VOMITING: 0
MYALGIAS: 0
BRUISES/BLEEDS EASILY: 0
CHILLS: 0
FEVER: 0
NAUSEA: 0
HEADACHES: 0

## 2020-04-26 NOTE — PROGRESS NOTES
"Subjective:      Miguel Navarro is a 49 y.o. male who presents with Follow-Up (Wound check, L upper arm, red, hot, swollen, pus, weeping/bleeding)            HPI  49-year-old male presents to urgent care with new problem to provider of abscess over left arm.  Patient was seen in urgent care on 4/23/2020 for I&D.  He was previously seen at [facility] for same and given IV Unasyn and p.o. antibiotics which he is still taking.  Patient presents today for wound recheck.  He states symptoms are improving some.  He denies fevers or increasing redness.  Patient is taking Norco for pain with good relief.  Denies other associated aggravating or alleviating factors.     Review of Systems   Constitutional: Negative for chills, fever and malaise/fatigue.   Gastrointestinal: Negative for nausea and vomiting.   Musculoskeletal: Negative for myalgias.   Skin:        Abscess left arm   Neurological: Negative for headaches.   Endo/Heme/Allergies: Does not bruise/bleed easily.   All other systems reviewed and are negative.    Past Medical History:   Diagnosis Date   • Headache(784.0)    • Hypertension    • Neck pain      Medications and allergies reviewed in epic.  Social History     Tobacco Use   • Smoking status: Never Smoker   • Smokeless tobacco: Never Used   Substance Use Topics   • Alcohol use: No      Objective:     /92 (BP Location: Right arm, Patient Position: Sitting, BP Cuff Size: Adult)   Pulse 90   Temp 37.1 °C (98.8 °F) (Temporal)   Resp 18   Ht 1.753 m (5' 9\")   Wt 81.2 kg (179 lb)   SpO2 95%   BMI 26.43 kg/m²      Physical Exam  Vitals signs reviewed.   Constitutional:       Appearance: Normal appearance. He is well-developed. He is not ill-appearing.   HENT:      Head: Normocephalic and atraumatic.      Mouth/Throat:      Mouth: Mucous membranes are moist.      Pharynx: Oropharynx is clear.   Eyes:      Conjunctiva/sclera: Conjunctivae normal.   Neck:      Musculoskeletal: Normal range of " "motion and neck supple.   Cardiovascular:      Rate and Rhythm: Normal rate.   Pulmonary:      Effort: Pulmonary effort is normal. No respiratory distress.   Musculoskeletal: Normal range of motion.   Skin:         Neurological:      Mental Status: He is alert and oriented to person, place, and time.   Psychiatric:         Mood and Affect: Mood normal.         Behavior: Behavior normal.         Thought Content: Thought content normal.         Judgment: Judgment normal.              Procedure: Incision and Drainage  -Risks, benefits, and alternatives discussed. Risks including infection, bleeding, nerve damage, and poor cosmetic outcome  -Sterile technique throughout  -Local anesthesia with 1% lidocaine   -Incision with #11 blade into fluctuant area with bloody dischrage expressed  -Cavity probed and multiple loculations bluntly taken down with hemostat  -Irrigated copiously with NS  -Packed with 1/4\" gauze  -Minimal bleeding with good hemostasis achieved  -The patient tolerated the procedure well    Assessment/Plan:     1. Abscess of left arm  ketorolac (TORADOL) injection 30 mg     Patient advised to follow-up in 3 days patient repacking.   1 more incision on the upper part was made secondary to area of fluctuance.  Original incision packed and irrigated.     Advised to continue the oral antibiotics  Differential diagnosis, natural history, supportive care, and indications for immediate follow-up discussed.      "

## 2020-04-27 ENCOUNTER — HOSPITAL ENCOUNTER (OUTPATIENT)
Dept: LAB | Facility: MEDICAL CENTER | Age: 50
End: 2020-04-27
Attending: NURSE PRACTITIONER
Payer: COMMERCIAL

## 2020-04-27 DIAGNOSIS — E78.2 MIXED HYPERLIPIDEMIA: ICD-10-CM

## 2020-04-27 DIAGNOSIS — I10 ESSENTIAL HYPERTENSION: ICD-10-CM

## 2020-04-27 LAB
ALBUMIN SERPL BCP-MCNC: 3.9 G/DL (ref 3.2–4.9)
ALBUMIN/GLOB SERPL: 1.3 G/DL
ALP SERPL-CCNC: 66 U/L (ref 30–99)
ALT SERPL-CCNC: 29 U/L (ref 2–50)
ANION GAP SERPL CALC-SCNC: 11 MMOL/L (ref 7–16)
AST SERPL-CCNC: 29 U/L (ref 12–45)
BASOPHILS # BLD AUTO: 1.3 % (ref 0–1.8)
BASOPHILS # BLD: 0.06 K/UL (ref 0–0.12)
BILIRUB SERPL-MCNC: 0.2 MG/DL (ref 0.1–1.5)
BUN SERPL-MCNC: 13 MG/DL (ref 8–22)
CALCIUM SERPL-MCNC: 8.8 MG/DL (ref 8.5–10.5)
CHLORIDE SERPL-SCNC: 100 MMOL/L (ref 96–112)
CHOLEST SERPL-MCNC: 159 MG/DL (ref 100–199)
CO2 SERPL-SCNC: 26 MMOL/L (ref 20–33)
CREAT SERPL-MCNC: 0.8 MG/DL (ref 0.5–1.4)
CREAT UR-MCNC: 131.8 MG/DL
EOSINOPHIL # BLD AUTO: 0.48 K/UL (ref 0–0.51)
EOSINOPHIL NFR BLD: 10.5 % (ref 0–6.9)
ERYTHROCYTE [DISTWIDTH] IN BLOOD BY AUTOMATED COUNT: 44.7 FL (ref 35.9–50)
FASTING STATUS PATIENT QL REPORTED: NORMAL
GLOBULIN SER CALC-MCNC: 2.9 G/DL (ref 1.9–3.5)
GLUCOSE SERPL-MCNC: 96 MG/DL (ref 65–99)
HCT VFR BLD AUTO: 40.9 % (ref 42–52)
HDLC SERPL-MCNC: 44 MG/DL
HGB BLD-MCNC: 13.7 G/DL (ref 14–18)
IMM GRANULOCYTES # BLD AUTO: 0.04 K/UL (ref 0–0.11)
IMM GRANULOCYTES NFR BLD AUTO: 0.9 % (ref 0–0.9)
LDLC SERPL CALC-MCNC: 93 MG/DL
LYMPHOCYTES # BLD AUTO: 1.54 K/UL (ref 1–4.8)
LYMPHOCYTES NFR BLD: 33.8 % (ref 22–41)
MCH RBC QN AUTO: 31.5 PG (ref 27–33)
MCHC RBC AUTO-ENTMCNC: 33.5 G/DL (ref 33.7–35.3)
MCV RBC AUTO: 94 FL (ref 81.4–97.8)
MICROALBUMIN UR-MCNC: <1.2 MG/DL
MICROALBUMIN/CREAT UR: NORMAL MG/G (ref 0–30)
MONOCYTES # BLD AUTO: 0.63 K/UL (ref 0–0.85)
MONOCYTES NFR BLD AUTO: 13.8 % (ref 0–13.4)
NEUTROPHILS # BLD AUTO: 1.81 K/UL (ref 1.82–7.42)
NEUTROPHILS NFR BLD: 39.7 % (ref 44–72)
NRBC # BLD AUTO: 0 K/UL
NRBC BLD-RTO: 0 /100 WBC
PLATELET # BLD AUTO: 320 K/UL (ref 164–446)
PMV BLD AUTO: 10 FL (ref 9–12.9)
POTASSIUM SERPL-SCNC: 3.5 MMOL/L (ref 3.6–5.5)
PROT SERPL-MCNC: 6.8 G/DL (ref 6–8.2)
RBC # BLD AUTO: 4.35 M/UL (ref 4.7–6.1)
SODIUM SERPL-SCNC: 137 MMOL/L (ref 135–145)
TRIGL SERPL-MCNC: 111 MG/DL (ref 0–149)
TSH SERPL DL<=0.005 MIU/L-ACNC: 0.85 UIU/ML (ref 0.38–5.33)
WBC # BLD AUTO: 4.6 K/UL (ref 4.8–10.8)

## 2020-04-27 PROCEDURE — 80053 COMPREHEN METABOLIC PANEL: CPT

## 2020-04-27 PROCEDURE — 84443 ASSAY THYROID STIM HORMONE: CPT

## 2020-04-27 PROCEDURE — 82570 ASSAY OF URINE CREATININE: CPT

## 2020-04-27 PROCEDURE — 82043 UR ALBUMIN QUANTITATIVE: CPT

## 2020-04-27 PROCEDURE — 80061 LIPID PANEL: CPT

## 2020-04-27 PROCEDURE — 85025 COMPLETE CBC W/AUTO DIFF WBC: CPT

## 2020-04-27 PROCEDURE — 36415 COLL VENOUS BLD VENIPUNCTURE: CPT

## 2020-04-28 ENCOUNTER — OFFICE VISIT (OUTPATIENT)
Dept: URGENT CARE | Facility: PHYSICIAN GROUP | Age: 50
End: 2020-04-28
Payer: COMMERCIAL

## 2020-04-28 VITALS
SYSTOLIC BLOOD PRESSURE: 132 MMHG | TEMPERATURE: 98.7 F | DIASTOLIC BLOOD PRESSURE: 86 MMHG | RESPIRATION RATE: 18 BRPM | OXYGEN SATURATION: 96 % | HEART RATE: 80 BPM

## 2020-04-28 DIAGNOSIS — L02.414 ABSCESS OF ARM, LEFT: ICD-10-CM

## 2020-04-28 PROCEDURE — 99212 OFFICE O/P EST SF 10 MIN: CPT | Performed by: PHYSICIAN ASSISTANT

## 2020-04-29 ENCOUNTER — OFFICE VISIT (OUTPATIENT)
Dept: MEDICAL GROUP | Facility: PHYSICIAN GROUP | Age: 50
End: 2020-04-29
Payer: COMMERCIAL

## 2020-04-29 VITALS
DIASTOLIC BLOOD PRESSURE: 80 MMHG | HEART RATE: 74 BPM | RESPIRATION RATE: 16 BRPM | TEMPERATURE: 98.5 F | BODY MASS INDEX: 25.62 KG/M2 | HEIGHT: 70 IN | WEIGHT: 179 LBS | OXYGEN SATURATION: 95 % | SYSTOLIC BLOOD PRESSURE: 128 MMHG

## 2020-04-29 DIAGNOSIS — E78.2 MIXED HYPERLIPIDEMIA: ICD-10-CM

## 2020-04-29 DIAGNOSIS — D64.9 LOW HEMOGLOBIN AND LOW HEMATOCRIT: ICD-10-CM

## 2020-04-29 DIAGNOSIS — I10 ESSENTIAL HYPERTENSION: ICD-10-CM

## 2020-04-29 DIAGNOSIS — L02.414 ABSCESS OF LEFT ARM: ICD-10-CM

## 2020-04-29 PROCEDURE — 99214 OFFICE O/P EST MOD 30 MIN: CPT | Performed by: NURSE PRACTITIONER

## 2020-04-29 RX ORDER — AMLODIPINE BESYLATE 10 MG/1
10 TABLET ORAL
Qty: 90 TAB | Refills: 3 | Status: SHIPPED | OUTPATIENT
Start: 2020-04-29

## 2020-04-29 ASSESSMENT — FIBROSIS 4 INDEX: FIB4 SCORE: 0.81

## 2020-04-29 NOTE — ASSESSMENT & PLAN NOTE
"Chronic, improving.  Not taking any cholesterol lowering medications.  10-year cardiac risk ASCVD score: 2.69%  Reports diet is \"okay\".   He is not following a low-cholesterol diet.  He is not exercising regularly.  He is not taking ASA daily.  He denies dizziness, claudication, or chest pain.  Due for updated lab work in April 2021.   6/23/2010 10:04 1/16/2019 06:44 4/27/2020 06:31   Cholesterol,Tot 202 (H) 214 (H) 159   Triglycerides 80 193 (H) 111   HDL 41 54 44    121 (H) 93     "

## 2020-04-29 NOTE — ASSESSMENT & PLAN NOTE
"Chronic, stable.    Currently taking amlodipine 10 mg/day as directed.   Reports diet is \"okay\".   He is not following a low-salt diet.  He is not exercising regularly.  He is not taking baby aspirin daily.   He is not monitoring BP at home.   Denies symptoms low BP: light-headed, tunnel-vision, unusual fatigue, dizziness.  Denies symptoms high BP: pounding headache, visual changes, palpitations, flushed face.   Denies chest pain, shortness of breath, dyspnea on exertion.   Denies medicine side effects: unusual fatigue, slow heartbeat, foot/leg swelling, cough.  Vitals 4/23/2020 4/26/2020 4/28/2020 4/29/2020   SYSTOLIC 130 150 132 128   DIASTOLIC 88 92 86 80   PULSE 101 90 80 74     "

## 2020-04-29 NOTE — ASSESSMENT & PLAN NOTE
New problem to examiner. The patient was admitted to [facility] 4/20/2020-4/21/2020 for a possible left upper arm abscess. Records reviewed, it was noted that a CT of left arm showed no abscess, but it showed soft tissue edema. He was diagnosed with cellulitis and treated with IV vanco and unasyn before discharge. He was discharged with a 10 day course of augmentin, which he has about 2 days remaining. He was then seen at [facility] on 4/23/2020 for an I&D of the left upper arm abscess which was irrigated and packed with plain gauze packing. He returned on 4/26/2020 for further I&D with irrigation, packing, and a toradol injection. Again, seen in  last night, 4/28/2020, for wound re-evaluation and re-packing. States that there is no more pain to the area and it has improved greatly. He does not understand why it was not drained while admitted to [facility]. Denies any LUE numbness or tingling.

## 2020-04-29 NOTE — PROGRESS NOTES
"CC:   Chief Complaint   Patient presents with   • Hospital Follow-up     cellulitis of left arm   • Results     Labs     HISTORY OF THE PRESENT ILLNESS: Patient is a 49 y.o. male. This pleasant patient is here today to review labs and follow up after ER and UC visit for left arm cellulitis/abscess.    Health Maintenance: Completed    Abscess of left arm  New problem to examiner. The patient was admitted to [facility] 4/20/2020-4/21/2020 for a possible left upper arm abscess. Records reviewed, it was noted that a CT of left arm showed no abscess, but it showed soft tissue edema. He was diagnosed with cellulitis and treated with IV vanco and unasyn before discharge. He was discharged with a 10 day course of augmentin, which he has about 2 days remaining. He was then seen at [facility] on 4/23/2020 for an I&D of the left upper arm abscess which was irrigated and packed with plain gauze packing. He returned on 4/26/2020 for further I&D with irrigation, packing, and a toradol injection. Again, seen in  last night, 4/28/2020, for wound re-evaluation and re-packing. States that there is no more pain to the area and it has improved greatly. He does not understand why it was not drained while admitted to [facility]. Denies any LUE numbness or tingling.    Essential hypertension  Chronic, stable.    Currently taking amlodipine 10 mg/day as directed.   Reports diet is \"okay\".   He is not following a low-salt diet.  He is not exercising regularly.  He is not taking baby aspirin daily.   He is not monitoring BP at home.   Denies symptoms low BP: light-headed, tunnel-vision, unusual fatigue, dizziness.  Denies symptoms high BP: pounding headache, visual changes, palpitations, flushed face.   Denies chest pain, shortness of breath, dyspnea on exertion.   Denies medicine side effects: unusual fatigue, slow heartbeat, foot/leg swelling, cough.  Vitals 4/23/2020 4/26/2020 4/28/2020 4/29/2020   SYSTOLIC 130 150 132 128   DIASTOLIC 88 92 86 80 " "  PULSE 101 90 80 74       Mixed hyperlipidemia  Chronic, improving.  Not taking any cholesterol lowering medications.  10-year cardiac risk ASCVD score: 2.69%  Reports diet is \"okay\".   He is not following a low-cholesterol diet.  He is not exercising regularly.  He is not taking ASA daily.  He denies dizziness, claudication, or chest pain.  Due for updated lab work in April 2021.   6/23/2010 10:04 1/16/2019 06:44 4/27/2020 06:31   Cholesterol,Tot 202 (H) 214 (H) 159   Triglycerides 80 193 (H) 111   HDL 41 54 44    121 (H) 93     Allergies: Lisinopril    Current Outpatient Medications Ordered in Epic   Medication Sig Dispense Refill   • amLODIPine (NORVASC) 10 MG Tab Take 1 Tab by mouth every day. 90 Tab 3   • omeprazole (PRILOSEC) 20 MG delayed-release capsule Take 1 Cap by mouth every day. 90 Cap 1   • EPINEPHrine (EPIPEN) 0.3 MG/0.3ML Solution Auto-injector solution for injection 0.3 mg by Intramuscular route as needed.     • rizatriptan (MAXALT) 10 MG tablet Take 10 mg by mouth Once PRN for Migraine.     • hydrocodone/apap  (NORCO)  MG TABS Take 1-2 Tabs by mouth every 8 hours as needed for Mild Pain. 60 Each 1     No current [facility]-ordered facility-administered medications on file.      Past Medical History:   Diagnosis Date   • Headache(784.0)    • Hypertension    • Neck pain      Past Surgical History:   Procedure Laterality Date   • MS DSTR NROLYTC AGNT PARVERTEB FCT SNGL CRVCL/THORA Right 10/21/2016    Procedure: NEURO DEST FACET C/T W/IG SNGL;  Surgeon: Kelton Gould D.O.;  [facility];  Service: Pain Management   • MS DSTR NROLYTC AGNT PARVERTEB FCT ADDL CRVCL/THORA  10/21/2016    Procedure: NEURO DEST FACET C/T W/IG ADDL;  Surgeon: Kelton Gould D.O.;  [facility];  Service: Pain Management   • MS DSTR NROLYTC AGNT PARVERTEB FCT SNGL CRVCL/THORA Left 7/8/2016    Procedure: NEURO DEST FACET C/T W/IG SNGL - C5-7;  Surgeon: Kelton GIRON" SHAYNA Gould;  Location: [facility];  Service: Pain Management   • AL DSTR NROLYTC AGNT PARVERTEB FCT ADDL CRVCL/THORA Left 7/8/2016    Procedure: NEURO DEST FACET C/T W/IG ADDL;  Surgeon: Kelton Gould D.O.;  Location: [facility];  Service: Pain Management   • AL DSTR NROLYTC AGNT PARVERTEB FCT SNGL CRVCL/THORA Right 2/12/2016    Procedure: NEURO DEST FACET C/T W/IG SNGL - C5-7;  Surgeon: Kelton Gould D.O.;  Location: [facility];  Service: Pain Management   • AL DSTR NROLYTC AGNT PARVERTEB FCT ADDL CRVCL/THORA  2/12/2016    Procedure: NEURO DEST FACET C/T W/IG ADDL;  Surgeon: Kelton Gould D.O.;  Location: [facility];  Service: Pain Management   • PB INJ CERV/THORAC,W/WO CNTRST Right 11/20/2015    Procedure: INJ EPI NON NEUROLYTIC C/T - C7-T1 W/SPRING TIP CATHETER TO RIGHT C5-6;  Surgeon: Kelton Gould D.O.;  Location: [facility];  Service: Pain Management   • PB FLUORSCOPIC GUIDANCE SPINAL INJECTION  11/20/2015    Procedure: FLUOROGUIDE FOR SPINAL INJ;  Surgeon: Kelton Gould D.O.;  Location: [facility];  Service: Pain Management   • NEURO DEST FACET C/T W/IG SNGL Right 5/8/2015    Procedure: RIGHT C4-7 FACET JOINT NERVE ABLATIONFLUORO, PRONE, RF PROTOCOL;  Surgeon: Kelton Gould D.O.;  Location: [facility];  Service: Pain Management   • NEURO DEST FACET C/T W/IG ADDL  5/8/2015    Procedure: NEURO DEST FACET C/T W/IG ADDL;  Surgeon: Kelton Gould D.O.;  Location: [facility];  Service: Pain Management   • NEURO DEST FACET C/T W/IG ADDL  5/8/2015    Procedure: NEURO DEST FACET C/T W/IG ADDL;  Surgeon: Kelton G Gould, D.O.;  [facility];  Service: Pain Management   • NEURO DEST FACET C/T W/IG SNGL  2/20/2015    Performed by Kelton Gould D.O. at [facility]   • NEURO DEST FACET C/T W/IG ADDL  2/20/2015    Performed  by Destini Franklin D.O. at [facility]   • NEURO DEST FACET C/T W/IG ADDL  2/20/2015    Performed by Destini Franklin D.O. at [facility]   • NEURO DEST FACET L/S W/IG SNGL  3/21/2014    Performed by Destini Franklin D.O. at [facility]   • NEURO DEST FACET C/T W/IG ADDL  3/21/2014    Performed by Destini Franklin D.O. at [facility]   • NEURO DEST FACET C/T W/IG ADDL  3/21/2014    Performed by Destini Franklin D.O. at [facility]S   • NEURO DEST FACET C/T W/IG ADDL  3/21/2014    Performed by Destini Franklin D.O. at [facility]   • INJ,EPIDURAL,CERV/THOR SINGLE  9/13/2013    Performed by Destini Franklin D.O. at [facility]   • INJ,EPIDURAL,CERV/THOR SINGLE  9/13/2013    Performed by Destini Franklin D.O. at [facility]   • INJ,EPIDURAL,CERV/THOR SINGLE  9/13/2013    Performed by Destini Franklin D.O. at [facility]   • NEURO DEST FACET C/T W/IG SNGL  5/3/2013    Performed by Destini Franklin D.O. at [facility]   • NEURO DEST FACET C/T W/IG ADDL  5/3/2013    Performed by Destini Franklin D.O. at [facility]   • NEURO DEST FACET C/T W/IG SNGL  1/11/2013    Performed by Destini Franklin D.O. at [facility]   • NEURO DEST FACET C/T W/IG SNGL  6/1/2012    Performed by DESTINI FRANKLIN at [facility]   • VA INJ(S) NERVE BLOCK GREAT OCCIPTL  5/11/2012    Performed by DESTINI FRANKLIN at [facility]   • VA DEST,PARAVERTEBRAL,C/T,SINGLE  9/23/2011    Performed by DESTINI FRANKLIN at [facility]   • VA DEST,PARAVERTEBRAL,C/T,ADDL  9/23/2011    Performed by DESTINI FRANKLIN at [facility]   • VA DEST,PARAVERTEBRAL,C/T,ADDL  9/23/2011    Performed by DESTINI FRANKLIN at [facility]   • VA DEST,PARAVERTEBRAL,C/T,ADDL  9/23/2011    Performed by RIGOBERTO  DESTINI GIRON at [facility]   • UT DEST,PARAVERTEBRAL,C/T,ADDL  8/5/2011    Performed by DESTINI FRANKLIN at [facility]   • UT DEST,PARAVERTEBRAL,C/T,SINGLE  8/5/2011    Performed by DESTINI FRANKLIN at [facility]   • PB INJ DX/THER AGNT PARAVERT FACET JOINT, CE*  7/15/2011    Performed by DESTINI FRANKLIN at [facility] ORS   • PB INJ DX/THER AGNT PARAVERT FACET JOINT, CE*  7/1/2011    Performed by DESTINI FRANKLIN at [facility]S   • LAMINOTOMY  2/18/2010    C4-C5 fusion   • ELBOW ORIF  year 2009    bilateral elbow surgery   • OTHER ORTHOPEDIC SURGERY      bilat knee surgeries    • OTHER ORTHOPEDIC SURGERY      bilat elbow surgeries for tennis elbow   • OTHER ORTHOPEDIC SURGERY      cervical fusions     Social History     Tobacco Use   • Smoking status: Never Smoker   • Smokeless tobacco: Never Used   Substance Use Topics   • Alcohol use: No   • Drug use: No     Social History     Social History Narrative   • Not on file     Family History   Problem Relation Age of Onset   • Other Mother         hep b carrier   • Hypertension Father    • No Known Problems Sister    • No Known Problems Brother    • No Known Problems Maternal Grandmother    • No Known Problems Maternal Grandfather    • No Known Problems Paternal Grandmother    • No Known Problems Paternal Grandfather    • No Known Problems Sister    • No Known Problems Brother    • No Known Problems Brother    • No Known Problems Brother    • No Known Problems Brother    • Other Brother         Hep B +     ROS:   Constitutional: No fevers, chills, malaise/fatigue.  Eyes: No eye pain.  ENT: No sore throat, congestion.   Resp: No cough, shortness of breath.  CV: No chest pain, leg swelling, palpitations.  GI: No nausea/vomiting, abdominal pain, constipation, diarrhea.  : No dysuria, hematuria.  MSK: No weakness.  Skin: + left posterior upper arm wound.  Neuro: No dizziness, weakness,  "headaches.  Psych: No suicidal ideations.    All remaining systems reviewed and found to be negative, except as stated above.       Exam: /80 (BP Location: Right arm, Patient Position: Sitting, BP Cuff Size: Adult)   Pulse 74   Temp 36.9 °C (98.5 °F) (Temporal)   Resp 16   Ht 1.778 m (5' 10\")   Wt 81.2 kg (179 lb)   SpO2 95%  Body mass index is 25.68 kg/m².    General: Well nourished, well developed male in NAD, awake and conversant.  Eyes: Normal conjunctiva, anicteric.  Round symmetrical pupils.  ENT: Hearing grossly intact.  No nasal discharge.  Neck: Neck is supple.  No masses or thyromegaly.  CV: No lower extremity edema.  Respiratory: Respirations are nonlabored.  No wheezing.  Abdomen: Non-Distended.  Skin: Posterior Left upper arm abscess approximately 5x7 cm with incision with tail of guaze packing, non tender to touch, no drainage. Erythema. Firmer than camrelo wound skin but improved per patient. Warm.   MSK: Normal ambulation.  No clubbing or cyanosis.  Neuro: Sensation and CN II-XII grossly normal.  Psych: Alert and oriented.  Cooperative, appropriate mood and affect, normal judgment.    Labs 4/27/2020: Reveiewed    Assessment/Plan:  1. Abscess of left arm  2. Low hemoglobin and low hematocrit  New problem to examiner. Improving per patient.  Encouraged the patient to complete entire course of Augmentin.  Notify provider or return to  if abscess worsens after completing antibiotics or if further wound care/check is needed.  Would redressed with non adherent foam and Tegaderm. Packing that was placed by urgent care last night remains in place.  Plan to repeat CBC in about 6 weeks to recheck due to low H/H.  - CBC WITH DIFFERENTIAL; Future    3. Essential hypertension  Chronic, stable.  Continue amlodipine 10 mg/day, refilled today.  Encouraged diet high in fruits, vegetables, and fiber. And a diet low in salt, refined carbohydrates, cholesterol, saturated fat, and trans fatty acids.  "   Encouraged a minimum of 30 minutes of moderate intensity aerobic exercise (eg, brisk walking) is recommended on five days each week. Or 30 minutes of vigorous-intensity aerobic exercise (eg, jogging) on three days each week.   - amLODIPine (NORVASC) 10 MG Tab; Take 1 Tab by mouth every day.  Dispense: 90 Tab; Refill: 3    4. Mixed hyperlipidemia  Chronic, stable.   Not on medication for this issue.   Encouraged diet high in fruits, vegetables, and fiber. And a diet low in salt, refined carbohydrates, cholesterol, saturated fat, and trans fatty acids.    Encouraged  a minimum of 30 minutes of moderate intensity aerobic exercise (eg, brisk walking) is recommended on five days each week. Or 30 minutes of vigorous-intensity aerobic exercise (eg, jogging) on three days each week.   Due for updated lab work in April 2021.    Educated in proper administration of medication(s) ordered today including safety, possible SE, risks, benefits, rationale and alternatives to therapy.   Supportive care, differential diagnoses, and indications for immediate follow-up discussed with patient.    Pathogenesis of diagnosis discussed including typical length and natural progression.    Instructed to return to clinic or nearest emergency department for any change in condition, further concerns, or worsening of symptoms.  Patient states understanding of the plan of care and discharge instructions.    Return if symptoms worsen or fail to improve.    Please note that this dictation was created using voice recognition software. I have made every reasonable attempt to correct obvious errors, but I expect that there are errors of grammar and possibly content that I did not discover before finalizing the note.

## 2020-05-01 ASSESSMENT — ENCOUNTER SYMPTOMS
MYALGIAS: 0
SENSORY CHANGE: 0
TINGLING: 0
FALLS: 0
CHILLS: 0
FEVER: 0

## 2020-05-01 NOTE — PROGRESS NOTES
Subjective:      Miguel Navarro is a 49 y.o. male who presents with Wound Check (poss repack of abscess)          Pt is here for wound recheck and possible packing of abscess on left arm.       Wound Check   Treated in ED: 4/23, then 4/25.  Previous treatment included I&D of abscess. There has been colored discharge from the wound. The redness has improved. The swelling has improved. The pain has improved.     Previous admission to [facility]- given IV ABX. And currently still taking po meds- reports improved drainage, pain, and redness. Taking Norco for pain relief only PRN.       Review of Systems   Constitutional: Negative for chills and fever.   Musculoskeletal: Negative for falls, joint pain and myalgias.   Neurological: Negative for tingling and sensory change.   All other systems reviewed and are negative.         Objective:     /86 (BP Location: Left arm, Patient Position: Sitting, BP Cuff Size: Small adult)   Pulse 80   Temp 37.1 °C (98.7 °F) (Temporal)   Resp 18   SpO2 96%    PMH:  has a past medical history of Headache(784.0), Hypertension, and Neck pain.  MEDS: Reviewed .   ALLERGIES:   Allergies   Allergen Reactions   • Lisinopril Swelling     Throat swelling     SURGHX:   Past Surgical History:   Procedure Laterality Date   • RI DSTR NROLYTC AGNT PARVERTEB FCT SNGL CRVCL/THORA Right 10/21/2016    Procedure: NEURO DEST FACET C/T W/IG SNGL;  Surgeon: Kelton Gould D.O.;  [facility];  Service: Pain Management   • RI DSTR NROLYTC AGNT PARVERTEB FCT ADDL CRVCL/THORA  10/21/2016    Procedure: NEURO DEST FACET C/T W/IG ADDL;  Surgeon: Kelton Gould D.O.;  [facility];  Service: Pain Management   • RI DSTR NROLYTC AGNT PARVERTEB FCT SNGL CRVCL/THORA Left 7/8/2016    Procedure: NEURO DEST FACET C/T W/IG SNGL - C5-7;  Surgeon: Kelton Gould D.O.;  [facility];  Service: Pain Management   • RI DSTR NROLYTC AGNT  PARVERTEB FCT ADDL CRVCL/THORA Left 7/8/2016    Procedure: NEURO DEST FACET C/T W/IG ADDL;  Surgeon: Kelton Gould D.O.;  Location: [facility];  Service: Pain Management   • [place] DSTR NROLYTC AGNT PARVERTEB FCT SNGL CRVCL/THORA Right 2/12/2016    Procedure: NEURO DEST FACET C/T W/IG SNGL - C5-7;  Surgeon: Kelton Gould D.O.;  Location: [facility];  Service: Pain Management   • [place] DSTR NROLYTC AGNT PARVERTEB FCT ADDL CRVCL/THORA  2/12/2016    Procedure: NEURO DEST FACET C/T W/IG ADDL;  Surgeon: Kelton Gould D.O.;  Location: [facility];  Service: Pain Management   • PB INJ CERV/THORAC,W/WO CNTRST Right 11/20/2015    Procedure: INJ EPI NON NEUROLYTIC C/T - C7-T1 W/SPRING TIP CATHETER TO RIGHT C5-6;  Surgeon: Kelton Gould D.O.;  Location: [facility];  Service: Pain Management   • PB FLUORSCOPIC GUIDANCE SPINAL INJECTION  11/20/2015    Procedure: FLUOROGUIDE FOR SPINAL INJ;  Surgeon: Kelton Gould D.O.;  Location: [facility];  Service: Pain Management   • NEURO DEST FACET C/T W/IG SNGL Right 5/8/2015    Procedure: RIGHT C4-7 FACET JOINT NERVE ABLATIONFLUORO, PRONE, RF PROTOCOL;  Surgeon: Kelton Gould D.O.;  Location: [facility];  Service: Pain Management   • NEURO DEST FACET C/T W/IG ADDL  5/8/2015    Procedure: NEURO DEST FACET C/T W/IG ADDL;  Surgeon: Kelton Gould D.O.;  Location: [facility];  Service: Pain Management   • NEURO DEST FACET C/T W/IG ADDL  5/8/2015    Procedure: NEURO DEST FACET C/T W/IG ADDL;  Surgeon: Kelton Gould D.O.;  Location: [facility];  Service: Pain Management   • NEURO DEST FACET C/T W/IG SNGL  2/20/2015    Performed by Kelton Gould D.O. at [facility]   • NEURO DEST FACET C/T W/IG ADDL  2/20/2015    Performed by Kelton Gould D.O. at [facility]   • NEURO DEST FACET C/T W/IG ADDL  2/20/2015     Performed by Destini Franklin D.O. at [facility]   • NEURO DEST FACET L/S W/IG SNGL  3/21/2014    Performed by Destini Franklin D.O. at [facility]   • NEURO DEST FACET C/T W/IG ADDL  3/21/2014    Performed by Destini Franklin D.O. at [facility]   • NEURO DEST FACET C/T W/IG ADDL  3/21/2014    Performed by Destini Franklin D.O. at [facility]   • NEURO DEST FACET C/T W/IG ADDL  3/21/2014    Performed by Destini Franklin D.O. at [facility]   • INJ,EPIDURAL,CERV/THOR SINGLE  9/13/2013    Performed by Destini Franklin D.O. at [facility]   • INJ,EPIDURAL,CERV/THOR SINGLE  9/13/2013    Performed by Destini Franklin D.O. at [facility]   • INJ,EPIDURAL,CERV/THOR SINGLE  9/13/2013    Performed by Destini Franklin D.O. at [facility]   • NEURO DEST FACET C/T W/IG SNGL  5/3/2013    Performed by Destini Franklin D.O. at [facility]   • NEURO DEST FACET C/T W/IG ADDL  5/3/2013    Performed by Destini Franklin D.O. at [facility]   • NEURO DEST FACET C/T W/IG SNGL  1/11/2013    Performed by Destini Franklin D.O. at [facility]   • NEURO DEST FACET C/T W/IG SNGL  6/1/2012    Performed by DESTINI FRANKLIN at [facility]   • MI INJ(S) NERVE BLOCK GREAT OCCIPTL  5/11/2012    Performed by DESTINI FRANKLIN at [facility]   • MI DEST,PARAVERTEBRAL,C/T,SINGLE  9/23/2011    Performed by DESTINI FRANKLIN at [facility]   • MI DEST,PARAVERTEBRAL,C/T,ADDL  9/23/2011    Performed by DESTINI FRANKLIN at [facility]   • MI DEST,PARAVERTEBRAL,C/T,ADDL  9/23/2011    Performed by DESTINI FRANKLIN at [facility]   • MI DEST,PARAVERTEBRAL,C/T,ADDL  9/23/2011    Performed by DESTINI FRANKLIN at [facility]   • MI DEST,PARAVERTEBRAL,C/T,ADDL  8/5/2011    Performed by RIGOBERTO  DESTINI GIRON at [facility] ORS   • RI DEST,PARAVERTEBRAL,C/T,SINGLE  8/5/2011    Performed by DESTINI FRANKLIN at [facility] ORS   • PB INJ DX/THER AGNT PARAVERT FACET JOINT, CE*  7/15/2011    Performed by DESTINI FRANKLIN at [facility] OR[facility]   • PB INJ DX/THER AGNT PARAVERT FACET JOINT, CE*  7/1/2011    Performed by DESTINI FRANKLIN at [facility]   • LAMINOTOMY  2/18/2010    C4-C5 fusion   • ELBOW ORIF  year 2009    bilateral elbow surgery   • OTHER ORTHOPEDIC SURGERY      bilat knee surgeries    • OTHER ORTHOPEDIC SURGERY      bilat elbow surgeries for tennis elbow   • OTHER ORTHOPEDIC SURGERY      cervical fusions     SOCHX:  reports that he has never smoked. He has never used smokeless tobacco. He reports that he does not drink alcohol or use drugs.  FH: Family history was reviewed, no pertinent findings to report    Physical Exam  Vitals signs reviewed.   Constitutional:       General: He is not in acute distress.     Appearance: He is well-developed.   HENT:      Head: Normocephalic and atraumatic.   Eyes:      Conjunctiva/sclera: Conjunctivae normal.      Pupils: Pupils are equal, round, and reactive to light.   Neck:      Musculoskeletal: Normal range of motion and neck supple.      Trachea: No tracheal deviation.   Cardiovascular:      Rate and Rhythm: Normal rate.   Pulmonary:      Effort: Pulmonary effort is normal.   Skin:     General: Skin is warm.             Comments: 2 discrete sites to LUE- one area of prior I&D- closed with minimal erythema, other site with open wound- minimal purulent drainage, irrigated with saline and repacked- minimal surrounding erythema and firmness, without streaking, increased warmth. Minimal tenderness. No fluctuance.    Neurological:      Mental Status: He is alert and oriented to person, place, and time.   Psychiatric:         Behavior: Behavior normal.         Thought Content: Thought content normal.         Judgment:  Judgment normal.                 Assessment/Plan:       1. Abscess of arm, left    Recheck with PCP tomorrow, recheck with us in 2-3 days for possible repacking- although very well healing at this time- may not need packing at next visit.     Continue with oral therapy.   RTC sooner for any worsening symptoms.

## 2020-11-23 DIAGNOSIS — R10.13 EPIGASTRIC PAIN: ICD-10-CM

## 2020-11-23 RX ORDER — OMEPRAZOLE 20 MG/1
CAPSULE, DELAYED RELEASE ORAL
Qty: 90 CAP | Refills: 0 | Status: SHIPPED | OUTPATIENT
Start: 2020-11-23 | End: 2021-04-06

## 2020-11-24 NOTE — TELEPHONE ENCOUNTER
Requested Prescriptions     Signed Prescriptions Disp Refills   • omeprazole (PRILOSEC) 20 MG delayed-release capsule 90 Cap 0     Sig: TAKE ONE CAPSULE BY MOUTH EVERY DAY     Authorizing Provider: MARCELLA DAMON A.P.R.N.

## 2021-04-06 DIAGNOSIS — R10.13 EPIGASTRIC PAIN: ICD-10-CM

## 2021-04-06 RX ORDER — OMEPRAZOLE 20 MG/1
CAPSULE, DELAYED RELEASE ORAL
Qty: 90 CAPSULE | Refills: 0 | Status: SHIPPED | OUTPATIENT
Start: 2021-04-06

## 2021-04-06 NOTE — TELEPHONE ENCOUNTER
Requested Prescriptions     Pending Prescriptions Disp Refills   • omeprazole (PRILOSEC) 20 MG delayed-release capsule [Pharmacy Med Name: OMEPRAZOLE DR 20 MG CAP AURO] 90 capsule 0     Sig: TAKE ONE CAPSULE BY MOUTH EVERY DAY       OSMAR Jones.

## 2021-07-22 ENCOUNTER — HOSPITAL ENCOUNTER (OUTPATIENT)
Dept: LAB | Facility: MEDICAL CENTER | Age: 51
End: 2021-07-22
Attending: FAMILY MEDICINE
Payer: COMMERCIAL

## 2021-07-22 LAB
ALBUMIN SERPL BCP-MCNC: 4.6 G/DL (ref 3.2–4.9)
ALBUMIN/GLOB SERPL: 1.6 G/DL
ALP SERPL-CCNC: 57 U/L (ref 30–99)
ALT SERPL-CCNC: 51 U/L (ref 2–50)
ANION GAP SERPL CALC-SCNC: 13 MMOL/L (ref 7–16)
AST SERPL-CCNC: 47 U/L (ref 12–45)
BASOPHILS # BLD AUTO: 1 % (ref 0–1.8)
BASOPHILS # BLD: 0.05 K/UL (ref 0–0.12)
BILIRUB SERPL-MCNC: 0.6 MG/DL (ref 0.1–1.5)
BUN SERPL-MCNC: 15 MG/DL (ref 8–22)
CALCIUM SERPL-MCNC: 9.2 MG/DL (ref 8.5–10.5)
CHLORIDE SERPL-SCNC: 103 MMOL/L (ref 96–112)
CHOLEST SERPL-MCNC: 209 MG/DL (ref 100–199)
CO2 SERPL-SCNC: 23 MMOL/L (ref 20–33)
CREAT SERPL-MCNC: 0.89 MG/DL (ref 0.5–1.4)
EOSINOPHIL # BLD AUTO: 0.24 K/UL (ref 0–0.51)
EOSINOPHIL NFR BLD: 4.8 % (ref 0–6.9)
ERYTHROCYTE [DISTWIDTH] IN BLOOD BY AUTOMATED COUNT: 46.1 FL (ref 35.9–50)
FASTING STATUS PATIENT QL REPORTED: NORMAL
GLOBULIN SER CALC-MCNC: 2.9 G/DL (ref 1.9–3.5)
GLUCOSE SERPL-MCNC: 92 MG/DL (ref 65–99)
HCT VFR BLD AUTO: 47.7 % (ref 42–52)
HDLC SERPL-MCNC: 66 MG/DL
HGB BLD-MCNC: 16.1 G/DL (ref 14–18)
IMM GRANULOCYTES # BLD AUTO: 0.01 K/UL (ref 0–0.11)
IMM GRANULOCYTES NFR BLD AUTO: 0.2 % (ref 0–0.9)
LDLC SERPL CALC-MCNC: 120 MG/DL
LYMPHOCYTES # BLD AUTO: 1.32 K/UL (ref 1–4.8)
LYMPHOCYTES NFR BLD: 26.2 % (ref 22–41)
MCH RBC QN AUTO: 31.9 PG (ref 27–33)
MCHC RBC AUTO-ENTMCNC: 33.8 G/DL (ref 33.7–35.3)
MCV RBC AUTO: 94.5 FL (ref 81.4–97.8)
MONOCYTES # BLD AUTO: 0.62 K/UL (ref 0–0.85)
MONOCYTES NFR BLD AUTO: 12.3 % (ref 0–13.4)
NEUTROPHILS # BLD AUTO: 2.8 K/UL (ref 1.82–7.42)
NEUTROPHILS NFR BLD: 55.5 % (ref 44–72)
NRBC # BLD AUTO: 0 K/UL
NRBC BLD-RTO: 0 /100 WBC
PLATELET # BLD AUTO: 278 K/UL (ref 164–446)
PMV BLD AUTO: 9.9 FL (ref 9–12.9)
POTASSIUM SERPL-SCNC: 4.1 MMOL/L (ref 3.6–5.5)
PROT SERPL-MCNC: 7.5 G/DL (ref 6–8.2)
PSA SERPL-MCNC: 0.99 NG/ML (ref 0–4)
RBC # BLD AUTO: 5.05 M/UL (ref 4.7–6.1)
SODIUM SERPL-SCNC: 139 MMOL/L (ref 135–145)
TRIGL SERPL-MCNC: 117 MG/DL (ref 0–149)
WBC # BLD AUTO: 5 K/UL (ref 4.8–10.8)

## 2021-07-22 PROCEDURE — 84153 ASSAY OF PSA TOTAL: CPT

## 2021-07-22 PROCEDURE — 80061 LIPID PANEL: CPT

## 2021-07-22 PROCEDURE — 85025 COMPLETE CBC W/AUTO DIFF WBC: CPT

## 2021-07-22 PROCEDURE — 80053 COMPREHEN METABOLIC PANEL: CPT

## 2021-07-22 PROCEDURE — 36415 COLL VENOUS BLD VENIPUNCTURE: CPT

## 2022-01-13 ENCOUNTER — APPOINTMENT (OUTPATIENT)
Dept: RADIOLOGY | Facility: MEDICAL CENTER | Age: 52
End: 2022-01-13
Attending: NURSE PRACTITIONER
Payer: COMMERCIAL

## 2023-07-06 ENCOUNTER — HOSPITAL ENCOUNTER (OUTPATIENT)
Dept: RADIOLOGY | Facility: MEDICAL CENTER | Age: 53
End: 2023-07-06
Attending: FAMILY MEDICINE
Payer: COMMERCIAL

## 2023-07-06 DIAGNOSIS — M25.571 RIGHT ANKLE PAIN, UNSPECIFIED CHRONICITY: ICD-10-CM

## 2023-07-06 PROCEDURE — 73610 X-RAY EXAM OF ANKLE: CPT | Mod: RT

## 2023-08-30 NOTE — RESULT ENCOUNTER NOTE
Discussed results in clinic visit. Physical Therapy Progress Note    Visit Type: Progress Note  Visit: 15  Referring Provider: Silvina Alford MD  Medical Diagnosis (from order): Diagnosis Information    Diagnosis  847.0 (ICD-9-CM) - S13.9XXA (ICD-10-CM) - Acute sprain of ligament of neck, initial encounter  782.0 (ICD-9-CM) - R20.2 (ICD-10-CM) - Tingling of upper extremity         SUBJECTIVE                                                                                                               Patient ultimately canceled his injection. Patient had a follow-up with occupational health earlier this week in which reported 40-50% improvement; therefore, referrals place for alternative options, such as pain management for injections. Patient states work comp only approving 3 more visits (including today). Patient states he wishes to perform traction today to assist with his pain (decreased pain afterwards and no pain by end of day).   Functional Change: See below   Current Functional Limitations: Improvements in pain and ROM after mechanical traction. Continued difficulty with turning for checking blind spots. Have not attempted work-related activities due to pain     Pain / Symptoms  - Pain rating (out of 10): Current: 5     Worker's Compensation Information  Occupation Information  - Current Employer:  [employer]  - Occupation: Dry cabral   - Current Work Status: off work due to current condition  - Full Duty Work Demands: heavy (more than 50 lbs)  - work overhead, lifting from floor, rotational/twisting motions, lifting overhead and standing >50% of the day     Notes  - /: Bina Adan ; Phone Number:  [phone]  - Date of Communication: 8/30/2023  - Anticipate Work Status: not return to stated job demands and further medical evaluation/recommendations  - Attendance: No attendance concerns.  - Attendance concerns: Left voicemail to  in regards to patient's progress in relation to  some improvements in pain and ROM after traction but continued difficulty with end range cervical rotation along with have not attempted work-related activities     Job Related Testing  - Job demands provided by: Client Report  Lift Floor to Waist     - Patient ability: Pain with heavy lifting from floor      - Job demand: Constantly  Lift Waist to Overhead      - Patient ability: Unable to perform at this time     - Job demands: Frequently-constantly  Sustained Overhead Work     - Patient ability: Unable to perform at this time      - Job demands: frequently (1/3-2/3 of the day), constantly (2/3-the full day)     OBJECTIVE                                                                                                                     Range of Motion (ROM)   (degrees unless noted; active unless noted; norms in ( ); negative=lacking to 0, positive=beyond 0)  Cervical:    - Flexion (45-50): 50° Cervical flexion ROM: pre: 40.    - Extension (45-60): 35° Cervical extension ROM: pre: 32.    - Rotation (60-80):        • Left: 52° Cervical L rotation ROM: pre: 32.        • Right: Cervical R rotation ROM: pre: 60.    - Side Bend (45):        • Left: 20° Cervical L sidebend ROM: pre: 16.        • Right: Cervical R sidebend ROM: pre: 20                     Outcome/Assessments  Outcome Measures:   Neck Disability Index (NDI): Neck Disability Index Score: 20  NDI Total Possible Score: 50  NDI Score Calculated: 40 %  (scored 0-100, higher score indicates higher disability) see flowsheet for additional documentation        Treatment    Mechanical Traction:  Mechanical Cervical Traction (18297)  - Position: supine Cervical angle: ~25-35 degrees   - intermittent Seconds (hold/relax): 60/20  - Pounds: 15 (min)-20 (max)  - Duration: 20 minutes 2 steps up to start and finish (2 minutes each); patient had emergency stop button     Results: decreased pain and improved range of motion  Reaction: no adverse reaction to  [email or web address]  URL: [email or web address]  

## 2023-12-05 ENCOUNTER — HOSPITAL ENCOUNTER (OUTPATIENT)
Dept: LAB | Facility: MEDICAL CENTER | Age: 53
End: 2023-12-05
Attending: FAMILY MEDICINE
Payer: COMMERCIAL

## 2023-12-05 LAB
ALBUMIN SERPL BCP-MCNC: 4.6 G/DL (ref 3.2–4.9)
ALBUMIN/GLOB SERPL: 1.8 G/DL
ALP SERPL-CCNC: 63 U/L (ref 30–99)
ALT SERPL-CCNC: 20 U/L (ref 2–50)
ANION GAP SERPL CALC-SCNC: 11 MMOL/L (ref 7–16)
AST SERPL-CCNC: 21 U/L (ref 12–45)
BASOPHILS # BLD AUTO: 1 % (ref 0–1.8)
BASOPHILS # BLD: 0.05 K/UL (ref 0–0.12)
BILIRUB SERPL-MCNC: 0.6 MG/DL (ref 0.1–1.5)
BUN SERPL-MCNC: 10 MG/DL (ref 8–22)
CALCIUM ALBUM COR SERPL-MCNC: 8.8 MG/DL (ref 8.5–10.5)
CALCIUM SERPL-MCNC: 9.3 MG/DL (ref 8.5–10.5)
CHLORIDE SERPL-SCNC: 102 MMOL/L (ref 96–112)
CHOLEST SERPL-MCNC: 187 MG/DL (ref 100–199)
CO2 SERPL-SCNC: 27 MMOL/L (ref 20–33)
CREAT SERPL-MCNC: 0.9 MG/DL (ref 0.5–1.4)
EOSINOPHIL # BLD AUTO: 0.12 K/UL (ref 0–0.51)
EOSINOPHIL NFR BLD: 2.5 % (ref 0–6.9)
ERYTHROCYTE [DISTWIDTH] IN BLOOD BY AUTOMATED COUNT: 45.1 FL (ref 35.9–50)
FASTING STATUS PATIENT QL REPORTED: NORMAL
GFR SERPLBLD CREATININE-BSD FMLA CKD-EPI: 103 ML/MIN/1.73 M 2
GLOBULIN SER CALC-MCNC: 2.6 G/DL (ref 1.9–3.5)
GLUCOSE SERPL-MCNC: 97 MG/DL (ref 65–99)
HCT VFR BLD AUTO: 46 % (ref 42–52)
HDLC SERPL-MCNC: 74 MG/DL
HGB BLD-MCNC: 15.9 G/DL (ref 14–18)
IMM GRANULOCYTES # BLD AUTO: 0.01 K/UL (ref 0–0.11)
IMM GRANULOCYTES NFR BLD AUTO: 0.2 % (ref 0–0.9)
LDLC SERPL CALC-MCNC: 85 MG/DL
LYMPHOCYTES # BLD AUTO: 1.36 K/UL (ref 1–4.8)
LYMPHOCYTES NFR BLD: 28 % (ref 22–41)
MCH RBC QN AUTO: 32.1 PG (ref 27–33)
MCHC RBC AUTO-ENTMCNC: 34.6 G/DL (ref 32.3–36.5)
MCV RBC AUTO: 92.7 FL (ref 81.4–97.8)
MONOCYTES # BLD AUTO: 0.49 K/UL (ref 0–0.85)
MONOCYTES NFR BLD AUTO: 10.1 % (ref 0–13.4)
NEUTROPHILS # BLD AUTO: 2.83 K/UL (ref 1.82–7.42)
NEUTROPHILS NFR BLD: 58.2 % (ref 44–72)
NRBC # BLD AUTO: 0 K/UL
NRBC BLD-RTO: 0 /100 WBC (ref 0–0.2)
PLATELET # BLD AUTO: 292 K/UL (ref 164–446)
PMV BLD AUTO: 10.3 FL (ref 9–12.9)
POTASSIUM SERPL-SCNC: 4 MMOL/L (ref 3.6–5.5)
PROT SERPL-MCNC: 7.2 G/DL (ref 6–8.2)
RBC # BLD AUTO: 4.96 M/UL (ref 4.7–6.1)
SODIUM SERPL-SCNC: 140 MMOL/L (ref 135–145)
TRIGL SERPL-MCNC: 140 MG/DL (ref 0–149)
WBC # BLD AUTO: 4.9 K/UL (ref 4.8–10.8)

## 2023-12-05 PROCEDURE — 80061 LIPID PANEL: CPT

## 2023-12-05 PROCEDURE — 84153 ASSAY OF PSA TOTAL: CPT

## 2023-12-05 PROCEDURE — 80053 COMPREHEN METABOLIC PANEL: CPT

## 2023-12-05 PROCEDURE — 85025 COMPLETE CBC W/AUTO DIFF WBC: CPT

## 2023-12-05 PROCEDURE — 36415 COLL VENOUS BLD VENIPUNCTURE: CPT

## 2023-12-06 LAB — PSA SERPL-MCNC: 2.34 NG/ML (ref 0–4)

## 2024-06-18 ENCOUNTER — HOSPITAL ENCOUNTER (OUTPATIENT)
Dept: RADIOLOGY | Facility: MEDICAL CENTER | Age: 54
End: 2024-06-18
Attending: NURSE PRACTITIONER
Payer: COMMERCIAL

## 2024-06-18 DIAGNOSIS — M54.17 LUMBOSACRAL RADICULITIS: ICD-10-CM

## 2024-06-18 DIAGNOSIS — M25.561 RIGHT KNEE PAIN, UNSPECIFIED CHRONICITY: ICD-10-CM

## 2024-06-18 PROCEDURE — 73560 X-RAY EXAM OF KNEE 1 OR 2: CPT | Mod: RT

## 2024-06-18 PROCEDURE — 72148 MRI LUMBAR SPINE W/O DYE: CPT

## 2025-01-22 ENCOUNTER — HOSPITAL ENCOUNTER (OUTPATIENT)
Dept: LAB | Facility: MEDICAL CENTER | Age: 55
End: 2025-01-22
Attending: FAMILY MEDICINE
Payer: COMMERCIAL

## 2025-01-22 LAB
ALBUMIN SERPL BCP-MCNC: 4.8 G/DL (ref 3.2–4.9)
ALBUMIN/GLOB SERPL: 1.9 G/DL
ALP SERPL-CCNC: 56 U/L (ref 30–99)
ALT SERPL-CCNC: 22 U/L (ref 2–50)
ANION GAP SERPL CALC-SCNC: 13 MMOL/L (ref 7–16)
AST SERPL-CCNC: 20 U/L (ref 12–45)
BASOPHILS # BLD AUTO: 1.1 % (ref 0–1.8)
BASOPHILS # BLD: 0.07 K/UL (ref 0–0.12)
BILIRUB SERPL-MCNC: 0.3 MG/DL (ref 0.1–1.5)
BUN SERPL-MCNC: 16 MG/DL (ref 8–22)
CALCIUM ALBUM COR SERPL-MCNC: 9 MG/DL (ref 8.5–10.5)
CALCIUM SERPL-MCNC: 9.6 MG/DL (ref 8.5–10.5)
CHLORIDE SERPL-SCNC: 101 MMOL/L (ref 96–112)
CHOLEST SERPL-MCNC: 191 MG/DL (ref 100–199)
CO2 SERPL-SCNC: 25 MMOL/L (ref 20–33)
CREAT SERPL-MCNC: 1.04 MG/DL (ref 0.5–1.4)
EOSINOPHIL # BLD AUTO: 0.24 K/UL (ref 0–0.51)
EOSINOPHIL NFR BLD: 3.8 % (ref 0–6.9)
ERYTHROCYTE [DISTWIDTH] IN BLOOD BY AUTOMATED COUNT: 44.7 FL (ref 35.9–50)
FASTING STATUS PATIENT QL REPORTED: NORMAL
GFR SERPLBLD CREATININE-BSD FMLA CKD-EPI: 86 ML/MIN/1.73 M 2
GLOBULIN SER CALC-MCNC: 2.5 G/DL (ref 1.9–3.5)
GLUCOSE SERPL-MCNC: 84 MG/DL (ref 65–99)
HCT VFR BLD AUTO: 48.2 % (ref 42–52)
HDLC SERPL-MCNC: 48 MG/DL
HGB BLD-MCNC: 16 G/DL (ref 14–18)
IMM GRANULOCYTES # BLD AUTO: 0.01 K/UL (ref 0–0.11)
IMM GRANULOCYTES NFR BLD AUTO: 0.2 % (ref 0–0.9)
LDLC SERPL CALC-MCNC: 106 MG/DL
LYMPHOCYTES # BLD AUTO: 1.87 K/UL (ref 1–4.8)
LYMPHOCYTES NFR BLD: 29.4 % (ref 22–41)
MCH RBC QN AUTO: 31.6 PG (ref 27–33)
MCHC RBC AUTO-ENTMCNC: 33.2 G/DL (ref 32.3–36.5)
MCV RBC AUTO: 95.1 FL (ref 81.4–97.8)
MONOCYTES # BLD AUTO: 0.57 K/UL (ref 0–0.85)
MONOCYTES NFR BLD AUTO: 9 % (ref 0–13.4)
NEUTROPHILS # BLD AUTO: 3.6 K/UL (ref 1.82–7.42)
NEUTROPHILS NFR BLD: 56.5 % (ref 44–72)
NRBC # BLD AUTO: 0 K/UL
NRBC BLD-RTO: 0 /100 WBC (ref 0–0.2)
PLATELET # BLD AUTO: 346 K/UL (ref 164–446)
PMV BLD AUTO: 10.6 FL (ref 9–12.9)
POTASSIUM SERPL-SCNC: 4.8 MMOL/L (ref 3.6–5.5)
PROT SERPL-MCNC: 7.3 G/DL (ref 6–8.2)
PSA SERPL DL<=0.01 NG/ML-MCNC: 0.81 NG/ML (ref 0–4)
RBC # BLD AUTO: 5.07 M/UL (ref 4.7–6.1)
SODIUM SERPL-SCNC: 139 MMOL/L (ref 135–145)
TRIGL SERPL-MCNC: 187 MG/DL (ref 0–149)
WBC # BLD AUTO: 6.4 K/UL (ref 4.8–10.8)

## 2025-01-22 PROCEDURE — 84153 ASSAY OF PSA TOTAL: CPT

## 2025-01-22 PROCEDURE — 80053 COMPREHEN METABOLIC PANEL: CPT

## 2025-01-22 PROCEDURE — 85025 COMPLETE CBC W/AUTO DIFF WBC: CPT

## 2025-01-22 PROCEDURE — 80061 LIPID PANEL: CPT

## 2025-01-22 PROCEDURE — 36415 COLL VENOUS BLD VENIPUNCTURE: CPT

## 2025-04-21 ENCOUNTER — HOSPITAL ENCOUNTER (OUTPATIENT)
Dept: RADIOLOGY | Facility: MEDICAL CENTER | Age: 55
End: 2025-04-21
Attending: NURSE PRACTITIONER
Payer: COMMERCIAL

## 2025-04-21 DIAGNOSIS — M54.12 CERVICAL RADICULOPATHY: ICD-10-CM

## 2025-04-21 PROCEDURE — 72050 X-RAY EXAM NECK SPINE 4/5VWS: CPT

## 2025-04-21 PROCEDURE — 72141 MRI NECK SPINE W/O DYE: CPT

## 2025-05-06 ENCOUNTER — HOSPITAL ENCOUNTER (OUTPATIENT)
Dept: RADIOLOGY | Facility: MEDICAL CENTER | Age: 55
End: 2025-05-06
Attending: NURSE PRACTITIONER
Payer: COMMERCIAL

## 2025-05-06 ENCOUNTER — HOSPITAL ENCOUNTER (EMERGENCY)
Facility: MEDICAL CENTER | Age: 55
End: 2025-05-06
Attending: EMERGENCY MEDICINE
Payer: COMMERCIAL

## 2025-05-06 VITALS
OXYGEN SATURATION: 95 % | HEART RATE: 91 BPM | RESPIRATION RATE: 16 BRPM | DIASTOLIC BLOOD PRESSURE: 98 MMHG | SYSTOLIC BLOOD PRESSURE: 156 MMHG

## 2025-05-06 VITALS
TEMPERATURE: 98.1 F | HEART RATE: 79 BPM | RESPIRATION RATE: 16 BRPM | DIASTOLIC BLOOD PRESSURE: 95 MMHG | WEIGHT: 168 LBS | SYSTOLIC BLOOD PRESSURE: 157 MMHG | OXYGEN SATURATION: 94 % | HEIGHT: 70 IN | BODY MASS INDEX: 24.05 KG/M2

## 2025-05-06 DIAGNOSIS — M99.81 OTHER BIOMECHANICAL LESIONS OF CERVICAL REGION: ICD-10-CM

## 2025-05-06 DIAGNOSIS — T78.40XA ALLERGIC REACTION, INITIAL ENCOUNTER: ICD-10-CM

## 2025-05-06 DIAGNOSIS — R22.1 NECK MASS: ICD-10-CM

## 2025-05-06 PROCEDURE — 700117 HCHG RX CONTRAST REV CODE 255: Mod: JZ | Performed by: NURSE PRACTITIONER

## 2025-05-06 PROCEDURE — 70543 MRI ORBT/FAC/NCK W/O &W/DYE: CPT

## 2025-05-06 PROCEDURE — 96374 THER/PROPH/DIAG INJ IV PUSH: CPT | Mod: XU

## 2025-05-06 PROCEDURE — 700111 HCHG RX REV CODE 636 W/ 250 OVERRIDE (IP): Mod: JZ | Performed by: EMERGENCY MEDICINE

## 2025-05-06 PROCEDURE — 99284 EMERGENCY DEPT VISIT MOD MDM: CPT

## 2025-05-06 PROCEDURE — A9579 GAD-BASE MR CONTRAST NOS,1ML: HCPCS | Mod: JZ | Performed by: NURSE PRACTITIONER

## 2025-05-06 PROCEDURE — 96375 TX/PRO/DX INJ NEW DRUG ADDON: CPT | Mod: XU

## 2025-05-06 RX ORDER — DIPHENHYDRAMINE HYDROCHLORIDE 50 MG/ML
50 INJECTION, SOLUTION INTRAMUSCULAR; INTRAVENOUS ONCE
Status: COMPLETED | OUTPATIENT
Start: 2025-05-06 | End: 2025-05-06

## 2025-05-06 RX ORDER — PREDNISONE 20 MG/1
40 TABLET ORAL DAILY
Qty: 8 TABLET | Refills: 0 | Status: SHIPPED | OUTPATIENT
Start: 2025-05-06 | End: 2025-05-10

## 2025-05-06 RX ORDER — DEXAMETHASONE SODIUM PHOSPHATE 4 MG/ML
12 INJECTION, SOLUTION INTRA-ARTICULAR; INTRALESIONAL; INTRAMUSCULAR; INTRAVENOUS; SOFT TISSUE ONCE
Status: COMPLETED | OUTPATIENT
Start: 2025-05-06 | End: 2025-05-06

## 2025-05-06 RX ORDER — DIPHENHYDRAMINE HCL 25 MG
25-50 CAPSULE ORAL EVERY 6 HOURS PRN
Qty: 30 CAPSULE | Refills: 0 | Status: SHIPPED | OUTPATIENT
Start: 2025-05-06

## 2025-05-06 RX ADMIN — GADOTERIDOL 15 ML: 279.3 INJECTION, SOLUTION INTRAVENOUS at 18:03

## 2025-05-06 RX ADMIN — DEXAMETHASONE SODIUM PHOSPHATE 12 MG: 4 INJECTION INTRA-ARTICULAR; INTRALESIONAL; INTRAMUSCULAR; INTRAVENOUS; SOFT TISSUE at 19:03

## 2025-05-06 RX ADMIN — DIPHENHYDRAMINE HYDROCHLORIDE 50 MG: 50 INJECTION, SOLUTION INTRAMUSCULAR; INTRAVENOUS at 19:04

## 2025-05-06 ASSESSMENT — FIBROSIS 4 INDEX: FIB4 SCORE: 0.65

## 2025-05-07 NOTE — DISCHARGE INSTRUCTIONS
We recommend taking the Benadryl for the next 4 days regardless of how you feel.  In theory this should suppress the allergic reaction from coming back    If you do get some itching and mild swelling you definitely take Benadryl    The most important thing is that if your symptoms get worse you need to come back to the ER especially of any trouble breathing please call 911    You decide to have MRI with contrast in the future please tell the radiology team and they can give you medication before the MRI if it is truly needed

## 2025-05-07 NOTE — ED TRIAGE NOTES
"Chief Complaint   Patient presents with    Medication Reaction     Pt received IV contrast for MRI and developed right eye swelling      Pt [facility] Rapid Response Team. Pt was receiving an outpatient MRI when he developed right eye swelling 5 minutes after receiving IV contrast. Pt denies throat swelling of difficulty breathing.     BP (!) 203/107   Pulse 89   Temp 36.7 °C (98.1 °F) (Temporal)   Resp 16   Ht 1.778 m (5' 10\")   Wt 76.2 kg (168 lb)   SpO2 96%     "

## 2025-05-07 NOTE — ED NOTES
Pt requesting discharge, states he would like to leave prior to full recommended observation period. ERP notified.

## 2025-05-07 NOTE — PROGRESS NOTES
MRI Nursing Note:    @1806 Pt received PIV ProHance (Gadolinium) in OP-MRI-2.     @1816 Pt complaining to MRI tech, Matthew, about feeling nauseous. MRI tech noticing new onset (R) sided orbital edema. MRI tech notified this RN & Liane RN. Both RN's to the bedside. Pt complaining of nasal congestion but denied difficulty swallowing saliva or SOB. Pt's voice clear w/o hoarseness.     @1817 Code Assist called.     @1818 VS obtained: /98, HR 91, SPO2 95% on RA, RR 16. O2 mask to the tableside.     @1819 Pt moved from scanner table to w/c outside of MRI room. New PIV inserted to (R) AC 18g. (L) sided 20g PIV used for contrast dye discontinued. Pt telling jokes to MRI team and breathing noted to be unlabored. No diaphoresis noted. Pt stating feelings of nausea have subsided. New onset hive like rash noted to pt's upper back.     @1820 Pt attached to cardiac monitor. 2nd set of VS obtained. Pt calm w/ unlabored breathing.     @1823 Code team members beginning to arrive. Report given and H&P provided.     @1830 Code team left department inbound for ED Red-2 w/ pt via w/c, team declined need for gurney. Pt aware of plan of care & agreeable. Pt belongings placed in bag and sent w/ pt & code team to ED.